# Patient Record
Sex: MALE | Race: WHITE | NOT HISPANIC OR LATINO
[De-identification: names, ages, dates, MRNs, and addresses within clinical notes are randomized per-mention and may not be internally consistent; named-entity substitution may affect disease eponyms.]

---

## 2018-09-20 PROBLEM — Z00.00 ENCOUNTER FOR PREVENTIVE HEALTH EXAMINATION: Status: ACTIVE | Noted: 2018-09-20

## 2018-09-24 ENCOUNTER — APPOINTMENT (OUTPATIENT)
Dept: CT IMAGING | Facility: CLINIC | Age: 83
End: 2018-09-24

## 2018-12-21 ENCOUNTER — EMERGENCY (EMERGENCY)
Facility: HOSPITAL | Age: 83
LOS: 1 days | Discharge: HOME | End: 2018-12-21
Attending: EMERGENCY MEDICINE | Admitting: EMERGENCY MEDICINE
Payer: COMMERCIAL

## 2018-12-21 VITALS
HEART RATE: 71 BPM | RESPIRATION RATE: 16 BRPM | TEMPERATURE: 98 F | DIASTOLIC BLOOD PRESSURE: 66 MMHG | OXYGEN SATURATION: 96 % | SYSTOLIC BLOOD PRESSURE: 128 MMHG

## 2018-12-21 VITALS
HEART RATE: 78 BPM | WEIGHT: 214.95 LBS | RESPIRATION RATE: 19 BRPM | TEMPERATURE: 98 F | OXYGEN SATURATION: 94 % | HEIGHT: 64 IN | DIASTOLIC BLOOD PRESSURE: 85 MMHG | SYSTOLIC BLOOD PRESSURE: 155 MMHG

## 2018-12-21 LAB
ALBUMIN SERPL ELPH-MCNC: 3.4 G/DL — SIGNIFICANT CHANGE UP (ref 3.3–5)
ALP SERPL-CCNC: 223 U/L — HIGH (ref 40–120)
ALT FLD-CCNC: 39 U/L — SIGNIFICANT CHANGE UP (ref 12–78)
ANION GAP SERPL CALC-SCNC: 9 MMOL/L — SIGNIFICANT CHANGE UP (ref 5–17)
AST SERPL-CCNC: 22 U/L — SIGNIFICANT CHANGE UP (ref 15–37)
BILIRUB SERPL-MCNC: 0.2 MG/DL — SIGNIFICANT CHANGE UP (ref 0.2–1.2)
BUN SERPL-MCNC: 20 MG/DL — SIGNIFICANT CHANGE UP (ref 7–23)
CALCIUM SERPL-MCNC: 8.2 MG/DL — LOW (ref 8.5–10.1)
CHLORIDE SERPL-SCNC: 108 MMOL/L — SIGNIFICANT CHANGE UP (ref 96–108)
CO2 SERPL-SCNC: 27 MMOL/L — SIGNIFICANT CHANGE UP (ref 22–31)
CREAT SERPL-MCNC: 0.96 MG/DL — SIGNIFICANT CHANGE UP (ref 0.5–1.3)
D DIMER BLD IA.RAPID-MCNC: 252 NG/ML DDU — HIGH
FLU A RESULT: SIGNIFICANT CHANGE UP
FLU A RESULT: SIGNIFICANT CHANGE UP
FLUAV AG NPH QL: SIGNIFICANT CHANGE UP
FLUBV AG NPH QL: SIGNIFICANT CHANGE UP
GLUCOSE SERPL-MCNC: 119 MG/DL — HIGH (ref 70–99)
HCT VFR BLD CALC: 40.2 % — SIGNIFICANT CHANGE UP (ref 39–50)
HGB BLD-MCNC: 13.3 G/DL — SIGNIFICANT CHANGE UP (ref 13–17)
MCHC RBC-ENTMCNC: 29.5 PG — SIGNIFICANT CHANGE UP (ref 27–34)
MCHC RBC-ENTMCNC: 33.1 GM/DL — SIGNIFICANT CHANGE UP (ref 32–36)
MCV RBC AUTO: 89.1 FL — SIGNIFICANT CHANGE UP (ref 80–100)
NRBC # BLD: 0 /100 WBCS — SIGNIFICANT CHANGE UP (ref 0–0)
PLATELET # BLD AUTO: 200 K/UL — SIGNIFICANT CHANGE UP (ref 150–400)
POTASSIUM SERPL-MCNC: 3.7 MMOL/L — SIGNIFICANT CHANGE UP (ref 3.5–5.3)
POTASSIUM SERPL-SCNC: 3.7 MMOL/L — SIGNIFICANT CHANGE UP (ref 3.5–5.3)
PROT SERPL-MCNC: 7.1 G/DL — SIGNIFICANT CHANGE UP (ref 6–8.3)
RBC # BLD: 4.51 M/UL — SIGNIFICANT CHANGE UP (ref 4.2–5.8)
RBC # FLD: 14.6 % — HIGH (ref 10.3–14.5)
RSV RESULT: SIGNIFICANT CHANGE UP
RSV RNA RESP QL NAA+PROBE: SIGNIFICANT CHANGE UP
SODIUM SERPL-SCNC: 144 MMOL/L — SIGNIFICANT CHANGE UP (ref 135–145)
TROPONIN I SERPL-MCNC: <.015 NG/ML — SIGNIFICANT CHANGE UP (ref 0.01–0.04)
TROPONIN I SERPL-MCNC: <.015 NG/ML — SIGNIFICANT CHANGE UP (ref 0.01–0.04)
WBC # BLD: 5.34 K/UL — SIGNIFICANT CHANGE UP (ref 3.8–10.5)
WBC # FLD AUTO: 5.34 K/UL — SIGNIFICANT CHANGE UP (ref 3.8–10.5)

## 2018-12-21 PROCEDURE — 99285 EMERGENCY DEPT VISIT HI MDM: CPT

## 2018-12-21 PROCEDURE — 85379 FIBRIN DEGRADATION QUANT: CPT

## 2018-12-21 PROCEDURE — 71045 X-RAY EXAM CHEST 1 VIEW: CPT

## 2018-12-21 PROCEDURE — 99282 EMERGENCY DEPT VISIT SF MDM: CPT | Mod: GC

## 2018-12-21 PROCEDURE — 93970 EXTREMITY STUDY: CPT | Mod: 26

## 2018-12-21 PROCEDURE — 93005 ELECTROCARDIOGRAM TRACING: CPT

## 2018-12-21 PROCEDURE — 71275 CT ANGIOGRAPHY CHEST: CPT | Mod: 26

## 2018-12-21 PROCEDURE — 71275 CT ANGIOGRAPHY CHEST: CPT

## 2018-12-21 PROCEDURE — 96374 THER/PROPH/DIAG INJ IV PUSH: CPT | Mod: XU

## 2018-12-21 PROCEDURE — 80053 COMPREHEN METABOLIC PANEL: CPT

## 2018-12-21 PROCEDURE — 93970 EXTREMITY STUDY: CPT

## 2018-12-21 PROCEDURE — 99284 EMERGENCY DEPT VISIT MOD MDM: CPT | Mod: 25

## 2018-12-21 PROCEDURE — 36415 COLL VENOUS BLD VENIPUNCTURE: CPT

## 2018-12-21 PROCEDURE — 87631 RESP VIRUS 3-5 TARGETS: CPT

## 2018-12-21 PROCEDURE — 85027 COMPLETE CBC AUTOMATED: CPT

## 2018-12-21 PROCEDURE — 71045 X-RAY EXAM CHEST 1 VIEW: CPT | Mod: 26

## 2018-12-21 PROCEDURE — 84484 ASSAY OF TROPONIN QUANT: CPT

## 2018-12-21 RX ORDER — ASPIRIN/CALCIUM CARB/MAGNESIUM 324 MG
325 TABLET ORAL ONCE
Qty: 0 | Refills: 0 | Status: COMPLETED | OUTPATIENT
Start: 2018-12-21 | End: 2018-12-21

## 2018-12-21 RX ORDER — PANTOPRAZOLE SODIUM 20 MG/1
40 TABLET, DELAYED RELEASE ORAL ONCE
Qty: 0 | Refills: 0 | Status: COMPLETED | OUTPATIENT
Start: 2018-12-21 | End: 2018-12-21

## 2018-12-21 RX ADMIN — Medication 325 MILLIGRAM(S): at 01:40

## 2018-12-21 RX ADMIN — PANTOPRAZOLE SODIUM 40 MILLIGRAM(S): 20 TABLET, DELAYED RELEASE ORAL at 01:58

## 2018-12-21 NOTE — ED PROVIDER NOTE - PROGRESS NOTE DETAILS
Dr Real notified will contact Dr Blanco. Cardiology Dr [facility] notified patient with 2 negative trops  patient with hiatal hernia with omentum - eval by Dr. Longoria - can follow up in office

## 2018-12-21 NOTE — ED ADULT NURSE NOTE - OBJECTIVE STATEMENT
Pt comes by EMS. Pt reports burning to epigastric area and esophagus. Pt breathing easy, unlabored, no signs of distress. Pt reports having trouble breathing sometimes. Pt placed on telemetry monitor. Pt recently traveled from [facility].

## 2018-12-21 NOTE — ED PROVIDER NOTE - CARE PLAN
Principal Discharge DX:	Chest pain Principal Discharge DX:	Chest pain  Secondary Diagnosis:	Hiatal hernia

## 2018-12-21 NOTE — ED PROVIDER NOTE - SIGNIFICANT NEGATIVE FINDINGS
no headache, no syncope, no diaphoresis, no eradiation, no palpitations, no abdominal pain,  no n/v, no urinary symptoms, no bleeding. no neuro changes.

## 2018-12-21 NOTE — ED ADULT NURSE REASSESSMENT NOTE - NS ED NURSE REASSESS COMMENT FT1
pt pain is more controlled, pt is comfortable at this time. pt indicates he is unable to give urine at this time. pt has family at the bedside. pt is awaiting txp to CT

## 2018-12-21 NOTE — ED ADULT NURSE REASSESSMENT NOTE - NS ED NURSE REASSESS COMMENT FT1
pt received at change of shift, pt agitated stating want to go home and nothing wrong with my heart. Pt consoled at length and agreeable to wait for cardio. No obvious distress noted. skin color good.

## 2018-12-21 NOTE — CONSULT NOTE ADULT - ASSESSMENT
Mr. Roque is a 89 year old male, with an episode of chest pain. The pain is atypical in origin, and more pleuritic in description.  1st set of CE is negative. EKG is a SR with RBBB, but no sign of ischemia.    - Repeat CE in 6 hours  - Add on d-dimer given pleuritic nature of pain and recent flight  - LE dopplers  - If all above is negative, no cardiac contraindication to d/c home. He will follow up with his primary cardiologist/PMD.

## 2018-12-21 NOTE — ED PROVIDER NOTE - CARE PROVIDER_API CALL
Kedar Longoria), Surgery  [address]  [address] [place] [address]  Phone: [phone]  Fax: [phone]    Raad Lofton), Cardiovascular Disease; Internal Medicine  [address] [place] [address]  Phone: [phone]  Fax: [phone]

## 2018-12-21 NOTE — ED ADULT TRIAGE NOTE - CHIEF COMPLAINT QUOTE
Patient presents via ambulance, "I have chest pressure and heart burn when I woke up 3 hours ago"   patient traveled from [facility] 2 days ago. Patient presents via ambulance, "I have chest pressure and heart burn when I woke up 3 hours ago"   patient traveled from [facility] 2 days ago, was seen by PCP yesterday for "body aches and not given any medication for it"

## 2018-12-21 NOTE — CONSULT NOTE ADULT - SUBJECTIVE AND OBJECTIVE BOX
[facility] - Andrae Lofton, Khadra, Tom, Chandrika, Kati Dukes  Office Number: [phone]    Initial Consult Note    CHIEF COMPLAINT: Patient is a 89y old  Male who presents with a chief complaint of chest pain    HPI:  89 y/o WM h/o Hyperlipidemia C/C  " [facility]SP[facility] chest pain described as heart burn with radiation to the throat.  When he woke up 3 hours ago PTA , occasional SOB, no diaphoresis, no Nausea, no vomiting patient traveled from [facility] 2 days ago, was seen by PCP yesterday for body aches. No fever no chills, no abdominal pain, no N/V no urinary symptoms, no GIB, no acute stroke symptoms    Recent flight home from [facility]. Reports le edema.  Initially went to his doctor because of a lung issue and difficulty breathing and was given an inhaler. Was told to go to the hospital if any fever or pain.  Has chest pain when taking deep breaths, but is resolved now.    PAST MEDICAL & SURGICAL HISTORY:  Hyperlipidemia  No significant past surgical history      SOCIAL HISTORY:  Denies significant tobacco, ethanol, or drug abuse.    FAMILY HISTORY:    No family history of acute MI or sudden cardiac death.    MEDICATIONS  (STANDING):    MEDICATIONS  (PRN):      Allergies    No Known Allergies    Intolerances        REVIEW OF SYSTEMS:    CONSTITUTIONAL: No weakness, fevers or chills  EYES/ENT: No visual changes;  No vertigo or throat pain   NECK: No pain or stiffness  RESPIRATORY: No cough, wheezing, hemoptysis; No shortness of breath  CARDIOVASCULAR: + chest pain, no palpitations  GASTROINTESTINAL: No abdominal pain. No nausea, vomiting, or hematemesis; No diarrhea or constipation. No melena or hematochezia.  GENITOURINARY: No dysuria, frequency or hematuria  NEUROLOGICAL: No numbness or weakness  SKIN: No itching or rash  All other review of systems is negative unless indicated above    VITAL SIGNS:   Vital Signs Last 24 Hrs  T(C): 36.4 (21 Dec 2018 06:00), Max: 36.5 (21 Dec 2018 01:00)  T(F): 97.6 (21 Dec 2018 06:00), Max: 97.7 (21 Dec 2018 01:00)  HR: 71 (21 Dec 2018 06:00) (71 - 78)  BP: 128/66 (21 Dec 2018 06:00) (128/66 - 155/85)  BP(mean): 87 (21 Dec 2018 06:00) (87 - 87)  RR: 16 (21 Dec 2018 06:00) (16 - 19)  SpO2: 96% (21 Dec 2018 06:00) (94% - 96%)    I&O's Summary      On Exam:    Constitutional: NAD, alert and oriented x 3  Lungs:  Non-labored, breath sounds are clear bilaterally, No wheezing, rales or rhonchi  Cardiovascular: RRR.  S1 and S2 positive.  No murmurs, rubs, gallops or clicks  Gastrointestinal: Bowel Sounds present, soft, nontender.   Lymph: + peripheral edema. No cervical lymphadenopathy.  Neurological: Alert, no focal deficits  Skin: No rashes or ulcers   Psych:  Mood & affect appropriate.    LABS: All Labs Reviewed:                        13.3   5.34  )-----------( 200      ( 21 Dec 2018 01:47 )             40.2     21 Dec 2018 01:47    144    |  108    |  20     ----------------------------<  119    3.7     |  27     |  0.96     Ca    8.2        21 Dec 2018 01:47    TPro  7.1    /  Alb  3.4    /  TBili  0.2    /  DBili  x      /  AST  22     /  ALT  39     /  AlkPhos  223    21 Dec 2018 01:47      CARDIAC MARKERS ( 21 Dec 2018 01:47 )  <.015 ng/mL / x     / x     / x     / x          Blood Culture:         RADIOLOGY:    EKG: SR, RBBB

## 2018-12-21 NOTE — CONSULT NOTE ADULT - SUBJECTIVE AND OBJECTIVE BOX
89 Y.O. M presents to [facility] ED with complaint of chest pain. Pt with recent travel to [place]. Pt reports pain is located centrally in the chest and radiates up and down towards epigastric region. Pt reports when he takes a deep breath there is some radiation to the back. Denies any arm pain, jaw pain. Reports occasional shoulder pain due to osteoarthritis. Pt reports he has a hx of reflux and takes occasional OTC anti-reflux meds. Pt underwent CT Angio of Chest which was negative to PE. Cardiac enzymes negative x 2. Incidental findings of intrathoracic omental herniation through the esophageal hiatus, as above. Pt reports chest discomfort has improved over the last few hrs.    PAST MEDICAL & SURGICAL HISTORY:  Hyperlipidemia  GERD    PSx: Right carotid endarectomy      Home Medications:  Lipitor:  (21 Dec 2018 02:34)    ICU Vital Signs Last 24 Hrs  T(C): 36.4 (21 Dec 2018 06:00), Max: 36.5 (21 Dec 2018 01:00)  T(F): 97.6 (21 Dec 2018 06:00), Max: 97.7 (21 Dec 2018 01:00)  HR: 71 (21 Dec 2018 06:00) (71 - 78)  BP: 128/66 (21 Dec 2018 06:00) (128/66 - 155/85)  BP(mean): 87 (21 Dec 2018 06:00) (87 - 87)  ABP: --  ABP(mean): --  RR: 16 (21 Dec 2018 06:00) (16 - 19)  SpO2: 96% (21 Dec 2018 06:00) (94% - 96%)      REVIEW OF SYSTEM:  CONSTITUTIONAL: No weakness, fevers or chills  EYES/ENT: No visual changes;  No vertigo or throat pain.  NECK: No pain or stiffness  RESPIRATORY: No cough, wheezing, hemoptysis; No shortness of breath  CARDIOVASCULAR: per HPI  GASTROINTESTINAL: No abdominal pain. Mild epigastric pain. No nausea, vomiting, or hematemesis; No diarrhea or constipation. No melena or hematochezia.  GENITOURINARY: No dysuria, frequency or hematuria  NEUROLOGICAL: No numbness or weakness  SKIN: No itching, burning, rashes, or lesions   All other review of systems is negative unless indicated above.      PHYSICAL EXAM:  GENERAL: NAD, well-groomed, well-developed  HEAD:  Atraumatic, Normocephalic  HEART: Regular rate and rhythm; No murmurs, rubs, or gallops. No chest wall or substernal ttp  RESPIRATORY: CTA B/L, No W/R/R  ABDOMEN: Soft, Nontender, protuberant, Nondistended; Bowel sounds present  NEUROLOGY: A&Ox3, nonfocal  SKIN: warm, dry, normal color, no rash or abnormal lesions                          13.3   5.34  )-----------( 200      ( 21 Dec 2018 01:47 )             40.2   12-21    144  |  108  |  20  ----------------------------<  119<H>  3.7   |  27  |  0.96    Ca    8.2<L>      21 Dec 2018 01:47    TPro  7.1  /  Alb  3.4  /  TBili  0.2  /  DBili  x   /  AST  22  /  ALT  39  /  AlkPhos  223<H>  12-21

## 2018-12-21 NOTE — ED ADULT NURSE NOTE - CHPI ED NUR SYMPTOMS NEG
no fever/no nausea/no back pain/no chills/no congestion/no syncope/no vomiting/no dizziness/no diaphoresis

## 2018-12-21 NOTE — CONSULT NOTE ADULT - ASSESSMENT
89 Y.O. with atypical chest pain, ruled out for cardiac/pulmonary origin likely related to hiatal hernia/GERD  1) Case discussed with Dr. Longoria- Pt to be discharged home on PPI and follow up with Dr. Longoria in the office for further work-up of hiatal hernia.  2) Return to ED if symptoms worsen/persist.

## 2018-12-21 NOTE — ED ADULT NURSE NOTE - CHIEF COMPLAINT QUOTE
Patient presents via ambulance, "I have chest pressure and heart burn when I woke up 3 hours ago"   patient traveled from [facility] 2 days ago, was seen by PCP yesterday for "body aches and not given any medication for it"

## 2018-12-21 NOTE — ED PROVIDER NOTE - CARE PROVIDERS DIRECT ADDRESSES
[email or web address]

## 2018-12-21 NOTE — ED PROVIDER NOTE - OBJECTIVE STATEMENT
Patient presents via ambulance, "I have chest pressure and heart burn when I woke up 3 hours ago"   patient traveled from [facility] 2 days ago, was seen by PCP yesterday for "body aches and not given any medication for it"  chest pain Hyperlipidemia "I have chest pressure and heart burn when I woke up 3 hours ago"   patient traveled from [facility] 2 days ago, was seen by PCP yesterday for "body aches and not given any medication for it"  chest pain 89 y/o WM h/o Hyperlipidemia C/C  " [facility] chest pain described as heart burn with radiation to the throat.  When he woke up 3 hours ago PTA , occasional SOB, no diaphoresis, no Nausea, no vomiting patient traveled from [facility] 2 days ago, was seen by PCP yesterday for body aches. No fever no chills, no abdominal pain, no N/V no urinary symptoms, no GIB, no acute stroke symptoms

## 2018-12-22 RX ORDER — ATORVASTATIN CALCIUM 80 MG/1
0 TABLET, FILM COATED ORAL
Qty: 0 | Refills: 0 | COMMUNITY

## 2018-12-27 PROBLEM — E78.5 HYPERLIPIDEMIA, UNSPECIFIED: Chronic | Status: ACTIVE | Noted: 2018-12-21

## 2019-01-02 ENCOUNTER — APPOINTMENT (OUTPATIENT)
Dept: CARDIOLOGY | Facility: CLINIC | Age: 84
End: 2019-01-02
Payer: MEDICARE

## 2019-01-02 ENCOUNTER — NON-APPOINTMENT (OUTPATIENT)
Age: 84
End: 2019-01-02

## 2019-01-02 VITALS
WEIGHT: 209 LBS | SYSTOLIC BLOOD PRESSURE: 164 MMHG | HEIGHT: 65 IN | HEART RATE: 84 BPM | DIASTOLIC BLOOD PRESSURE: 77 MMHG | OXYGEN SATURATION: 95 % | BODY MASS INDEX: 34.82 KG/M2

## 2019-01-02 VITALS — DIASTOLIC BLOOD PRESSURE: 76 MMHG | SYSTOLIC BLOOD PRESSURE: 128 MMHG

## 2019-01-02 DIAGNOSIS — Z87.891 PERSONAL HISTORY OF NICOTINE DEPENDENCE: ICD-10-CM

## 2019-01-02 DIAGNOSIS — K44.9 DIAPHRAGMATIC HERNIA W/OUT OBSTRUCTION OR GANGRENE: ICD-10-CM

## 2019-01-02 DIAGNOSIS — Z87.81 PERSONAL HISTORY OF (HEALED) TRAUMATIC FRACTURE: ICD-10-CM

## 2019-01-02 DIAGNOSIS — R07.89 OTHER CHEST PAIN: ICD-10-CM

## 2019-01-02 DIAGNOSIS — I65.21 OCCLUSION AND STENOSIS OF RIGHT CAROTID ARTERY: ICD-10-CM

## 2019-01-02 DIAGNOSIS — I45.10 UNSPECIFIED RIGHT BUNDLE-BRANCH BLOCK: ICD-10-CM

## 2019-01-02 DIAGNOSIS — E78.5 HYPERLIPIDEMIA, UNSPECIFIED: ICD-10-CM

## 2019-01-02 DIAGNOSIS — H35.30 UNSPECIFIED MACULAR DEGENERATION: ICD-10-CM

## 2019-01-02 DIAGNOSIS — G45.9 TRANSIENT CEREBRAL ISCHEMIC ATTACK, UNSPECIFIED: ICD-10-CM

## 2019-01-02 DIAGNOSIS — I49.1 ATRIAL PREMATURE DEPOLARIZATION: ICD-10-CM

## 2019-01-02 PROCEDURE — 93000 ELECTROCARDIOGRAM COMPLETE: CPT

## 2019-01-02 PROCEDURE — 99215 OFFICE O/P EST HI 40 MIN: CPT

## 2019-01-02 RX ORDER — ATORVASTATIN CALCIUM 20 MG/1
20 TABLET, FILM COATED ORAL DAILY
Qty: 90 | Refills: 3 | Status: ACTIVE | COMMUNITY

## 2019-01-02 RX ORDER — VIT A/VIT C/VIT E/ZINC/COPPER 4296-226
CAPSULE ORAL DAILY
Refills: 0 | Status: ACTIVE | COMMUNITY

## 2019-01-02 RX ORDER — PNV NO.95/FERROUS FUM/FOLIC AC 28MG-0.8MG
TABLET ORAL
Refills: 0 | Status: ACTIVE | COMMUNITY

## 2019-01-02 RX ORDER — ASPIRIN 81 MG
81 TABLET, DELAYED RELEASE (ENTERIC COATED) ORAL DAILY
Refills: 0 | Status: ACTIVE | COMMUNITY

## 2019-01-02 NOTE — DISCUSSION/SUMMARY
[FreeTextEntry1] : The epigastric/chest discomfort symptom that the patient experienced on 12/21/18 in all likelihood represented esophageal reflux related to a moderate-sized hiatal hernia, demonstrated on the chest CT angiogram. The evaluation in the emergency room was negative for evidence of an acute coronary syndrome. The patient does not describe having experienced any signs or symptoms which would be considered typical for an anginal syndrome, congestive heart failure, or a hemodynamically-compromising arrhythmia. His cardiac examination today is remarkable for a soft systolic ejection murmur, and all likelihood representing age-related valvular calcification. He is not exhibiting evidence for congestive heart failure on examination. He does have a right carotid endarterectomy scar. His blood pressure reading was moderately elevated upon initial presentation to the office today, however, a follow-up measurement obtained after his examination revealed the reading to be normal. His electrocardiogram today reveals sinus rhythm with an isolated atrial premature contraction and a right bundle branch block pattern.\par \par I have reassured the patient today that his symptom of epigastric/chest discomfort on 12/21/18 was not cardiac-related based on his evaluation in the emergency room, the more likely, represented esophageal reflux related to a moderate-sized hiatal hernia. The patient states that the symptoms he experienced on 12/21/18 was reminiscent of previous episodes of heartburn, which have been relieved with Mylanta.\par \par On the electrocardiogram in this patient most likely represents age-related cardiac conduction system disease. The patient does not describe having experienced any signs or symptoms to suggest the presence of a hemodynamically-compromising bradyarrhythmia.\par \par The patient is going to be returning to his home in [place] toward the end of this week. I have recommended to him that he follow up with his physician when he returns home regarding his symptoms of esophageal reflux and the finding of a moderate-sized hiatal hernia on the chest CT angiogram performed on 12/21/18. I have asked the patient to call our office in the interim for any questions or problems pertaining to these matters, and especially if he should experience any concerning signs or symptoms.

## 2019-01-02 NOTE — PHYSICAL EXAM
[General Appearance - In No Acute Distress] : no acute distress [Normal Conjunctiva] : the conjunctiva exhibited no abnormalities [No Oral Pallor] : no oral pallor [Respiration, Rhythm And Depth] : normal respiratory rhythm and effort [Auscultation Breath Sounds / Voice Sounds] : lungs were clear to auscultation bilaterally [Not Palpable] : not palpable [No Precordial Heave] : no precordial heave was noted [Normal Rate] : normal [Rhythm Regular] : regular [Normal S1] : normal S1 [Normal S2] : normal S2 [No Gallop] : no gallop heard [I] : a grade 1 [2+] : left 2+ [No Abnormalities] : the abdominal aorta was not enlarged and no bruit was heard [No Pitting Edema] : no pitting edema present [Rt] : varicose veins of the right leg noted [Lt] : varicose veins of the left leg noted [Bowel Sounds] : normal bowel sounds [Abdomen Tenderness] : non-tender [Abnormal Walk] : normal gait [Cyanosis, Localized] : no localized cyanosis [] : no rash [Oriented To Time, Place, And Person] : oriented to person, place, and time [FreeTextEntry1] : no JVD is appreciated a 45° angle [Apical Thrill] : no thrill palpable at the apex [Click] : no click [Distant] : the heart sounds were ~L not distant [Pericardial Rub] : no pericardial rub [Right Carotid Bruit] : no bruit heard over the right carotid [Left Carotid Bruit] : no bruit heard over the left carotid

## 2019-01-02 NOTE — HISTORY OF PRESENT ILLNESS
[FreeTextEntry1] : Mr. Orlin Ramirez presented to the office today for follow-up cardiac evaluation, noting that he was recently evaluated in the emergency room at [facility] for atypical chest discomfort.\par \par The patient is a 89-year-old male who is visiting [place] from [place], where he plans on returning at the end of the week. He is a suboptimal historian. He denies having any known cardiac history. He does describe having experienced a TIA in 2011, having presented with a visual disturbance involving the right eye, and subsequently undergoing a right carotid endarterectomy procedure on 8/17/11. He has a history of a dyslipidemia, which has been managed with statin therapy. He described having a history of a hiatal hernia and GERD.\par \par The patient awakened on 12/21/18 with a epigastric/chest discomfort, described as a sensation of "heartburn", radiating to the throat. He has experienced this sensation previously, with the symptom typically being relieved with Mylanta. He was staying at a friend's house in [place], however, and he did not have Mylanta with him. His friend became concerned, and summoned an ambulance. The patient was evaluated in the emergency room at [facility] by my associate, Dr. Parikh, removal of that at the patient's symptom was not cardiac in nature. A right bundle-branch block pattern was exhibited on the patient's electrocardiogram, without acute ischemic changes. A chest x-ray revealed clear lung fields. Cardiac enzymes were negative for evidence of an acute coronary syndrome. The D-dimer level was mildly elevated, for which low extremity venous Doppler testing and chest CT angiography were performed. The lower extremity venous Doppler study was negative for evidence of deep venous thrombosis. The chest CT angiogram was negative for evidence of a pulmonary embolism. Age-related atherosclerotic changes were noted.  A moderate-sized hiatal hernia was noted. The patient's presenting symptoms were attributed to GERD, for which a proton pump inhibitor was recommended to the patient (he has not obtained this medication, however). He was released from the emergency room after several hours of observation and evaluation, and has not experienced recurrence of similar symptoms since his presentation.\par \par The patient does not describe having experienced any symptoms of chest discomfort which have been precipitated or exacerbated by physical activity. He has not noted dyspnea on exertion in association with his usual activities. He has not noted orthopnea or paroxysmal nocturnal dyspnea. He reports having exhibited mild lower extremity swelling for several years now, without any recent appreciable change in the degree. He has not experienced any episodes of palpitations, presyncope or syncope.\par \par Review of systems is significant for the patient having recently been evaluated by a pulmonologist regarding a carotid dyspnea, and he describes having been diagnosed as having "bronchitis", for which Breo Ellipta inhaler was prescribed (he is no longer using this inhaler).\par \par As far as risk factors for coronary artery disease are concerned, the patient has a history of a dyslipidemia, for which he is presently being treated with Lipitor. He denies having a history of diabetes or hypertension. He reports having discontinued cigarette smoking in 1954, having previously smoked up to 5 packs per day for a period of approximately 5 years. He does not have a known immediate family history of premature coronary artery disease.\par \par Other than previously stated, past medical/surgical history according to the patient is significant for bilateral shoulder replacement surgeries, a traumatic cervical fracture (the patient states that a door fell on his head), and tonsillectomy as a child.

## 2019-01-08 ENCOUNTER — APPOINTMENT (OUTPATIENT)
Dept: SURGERY | Facility: CLINIC | Age: 84
End: 2019-01-08

## 2019-02-10 PROBLEM — I45.10 RIGHT BUNDLE BRANCH BLOCK (RBBB): Status: ACTIVE | Noted: 2019-01-02

## 2020-06-30 NOTE — ED ADULT NURSE NOTE - CCCP TRG CHIEF CMPLNT
chest pain Minoxidil Counseling: Minoxidil is a topical medication which can increase blood flow where it is applied. It is uncertain how this medication increases hair growth. Side effects are uncommon and include stinging and allergic reactions.

## 2021-10-04 ENCOUNTER — EMERGENCY (EMERGENCY)
Facility: HOSPITAL | Age: 86
LOS: 1 days | Discharge: HOME | End: 2021-10-04
Attending: EMERGENCY MEDICINE | Admitting: EMERGENCY MEDICINE
Payer: MEDICARE

## 2021-10-04 VITALS
DIASTOLIC BLOOD PRESSURE: 79 MMHG | WEIGHT: 225.09 LBS | RESPIRATION RATE: 18 BRPM | SYSTOLIC BLOOD PRESSURE: 184 MMHG | HEART RATE: 86 BPM | TEMPERATURE: 100 F | OXYGEN SATURATION: 93 % | HEIGHT: 64 IN

## 2021-10-04 VITALS
TEMPERATURE: 99 F | OXYGEN SATURATION: 94 % | SYSTOLIC BLOOD PRESSURE: 155 MMHG | RESPIRATION RATE: 16 BRPM | HEART RATE: 85 BPM | DIASTOLIC BLOOD PRESSURE: 74 MMHG

## 2021-10-04 LAB
ALBUMIN SERPL ELPH-MCNC: 3.8 G/DL — SIGNIFICANT CHANGE UP (ref 3.3–5)
ALP SERPL-CCNC: 247 U/L — HIGH (ref 40–120)
ALT FLD-CCNC: 32 U/L — SIGNIFICANT CHANGE UP (ref 12–78)
ANION GAP SERPL CALC-SCNC: 7 MMOL/L — SIGNIFICANT CHANGE UP (ref 5–17)
APPEARANCE UR: CLEAR — SIGNIFICANT CHANGE UP
AST SERPL-CCNC: 24 U/L — SIGNIFICANT CHANGE UP (ref 15–37)
BASOPHILS # BLD AUTO: 0.02 K/UL — SIGNIFICANT CHANGE UP (ref 0–0.2)
BASOPHILS NFR BLD AUTO: 0.3 % — SIGNIFICANT CHANGE UP (ref 0–2)
BILIRUB SERPL-MCNC: 0.4 MG/DL — SIGNIFICANT CHANGE UP (ref 0.2–1.2)
BILIRUB UR-MCNC: NEGATIVE — SIGNIFICANT CHANGE UP
BUN SERPL-MCNC: 20 MG/DL — SIGNIFICANT CHANGE UP (ref 7–23)
CALCIUM SERPL-MCNC: 8.7 MG/DL — SIGNIFICANT CHANGE UP (ref 8.5–10.1)
CHLORIDE SERPL-SCNC: 108 MMOL/L — SIGNIFICANT CHANGE UP (ref 96–108)
CO2 SERPL-SCNC: 27 MMOL/L — SIGNIFICANT CHANGE UP (ref 22–31)
COLOR SPEC: YELLOW — SIGNIFICANT CHANGE UP
CREAT SERPL-MCNC: 1.1 MG/DL — SIGNIFICANT CHANGE UP (ref 0.5–1.3)
DIFF PNL FLD: ABNORMAL
EOSINOPHIL # BLD AUTO: 0.07 K/UL — SIGNIFICANT CHANGE UP (ref 0–0.5)
EOSINOPHIL NFR BLD AUTO: 1.1 % — SIGNIFICANT CHANGE UP (ref 0–6)
GLUCOSE SERPL-MCNC: 131 MG/DL — HIGH (ref 70–99)
GLUCOSE UR QL: NEGATIVE — SIGNIFICANT CHANGE UP
HCT VFR BLD CALC: 44.3 % — SIGNIFICANT CHANGE UP (ref 39–50)
HGB BLD-MCNC: 14.4 G/DL — SIGNIFICANT CHANGE UP (ref 13–17)
IMM GRANULOCYTES NFR BLD AUTO: 0.5 % — SIGNIFICANT CHANGE UP (ref 0–1.5)
KETONES UR-MCNC: NEGATIVE — SIGNIFICANT CHANGE UP
LEUKOCYTE ESTERASE UR-ACNC: NEGATIVE — SIGNIFICANT CHANGE UP
LYMPHOCYTES # BLD AUTO: 0.69 K/UL — LOW (ref 1–3.3)
LYMPHOCYTES # BLD AUTO: 10.8 % — LOW (ref 13–44)
MCHC RBC-ENTMCNC: 29.8 PG — SIGNIFICANT CHANGE UP (ref 27–34)
MCHC RBC-ENTMCNC: 32.5 GM/DL — SIGNIFICANT CHANGE UP (ref 32–36)
MCV RBC AUTO: 91.5 FL — SIGNIFICANT CHANGE UP (ref 80–100)
MONOCYTES # BLD AUTO: 0.56 K/UL — SIGNIFICANT CHANGE UP (ref 0–0.9)
MONOCYTES NFR BLD AUTO: 8.7 % — SIGNIFICANT CHANGE UP (ref 2–14)
NEUTROPHILS # BLD AUTO: 5.04 K/UL — SIGNIFICANT CHANGE UP (ref 1.8–7.4)
NEUTROPHILS NFR BLD AUTO: 78.6 % — HIGH (ref 43–77)
NITRITE UR-MCNC: NEGATIVE — SIGNIFICANT CHANGE UP
NRBC # BLD: 0 /100 WBCS — SIGNIFICANT CHANGE UP (ref 0–0)
PH UR: 5 — SIGNIFICANT CHANGE UP (ref 5–8)
PLATELET # BLD AUTO: 177 K/UL — SIGNIFICANT CHANGE UP (ref 150–400)
POTASSIUM SERPL-MCNC: 3.9 MMOL/L — SIGNIFICANT CHANGE UP (ref 3.5–5.3)
POTASSIUM SERPL-SCNC: 3.9 MMOL/L — SIGNIFICANT CHANGE UP (ref 3.5–5.3)
PROT SERPL-MCNC: 8.1 G/DL — SIGNIFICANT CHANGE UP (ref 6–8.3)
PROT UR-MCNC: NEGATIVE — SIGNIFICANT CHANGE UP
RBC # BLD: 4.84 M/UL — SIGNIFICANT CHANGE UP (ref 4.2–5.8)
RBC # FLD: 14.5 % — SIGNIFICANT CHANGE UP (ref 10.3–14.5)
SODIUM SERPL-SCNC: 142 MMOL/L — SIGNIFICANT CHANGE UP (ref 135–145)
SP GR SPEC: 1.01 — SIGNIFICANT CHANGE UP (ref 1.01–1.02)
TROPONIN I SERPL-MCNC: <.015 NG/ML — SIGNIFICANT CHANGE UP (ref 0.01–0.04)
UROBILINOGEN FLD QL: NEGATIVE — SIGNIFICANT CHANGE UP
WBC # BLD: 6.41 K/UL — SIGNIFICANT CHANGE UP (ref 3.8–10.5)
WBC # FLD AUTO: 6.41 K/UL — SIGNIFICANT CHANGE UP (ref 3.8–10.5)

## 2021-10-04 PROCEDURE — 93010 ELECTROCARDIOGRAM REPORT: CPT

## 2021-10-04 PROCEDURE — 70450 CT HEAD/BRAIN W/O DYE: CPT | Mod: 26,MG

## 2021-10-04 PROCEDURE — G1004: CPT

## 2021-10-04 PROCEDURE — 71045 X-RAY EXAM CHEST 1 VIEW: CPT | Mod: 26

## 2021-10-04 PROCEDURE — 99285 EMERGENCY DEPT VISIT HI MDM: CPT | Mod: CS

## 2021-10-04 RX ORDER — SODIUM CHLORIDE 9 MG/ML
1000 INJECTION INTRAMUSCULAR; INTRAVENOUS; SUBCUTANEOUS ONCE
Refills: 0 | Status: COMPLETED | OUTPATIENT
Start: 2021-10-04 | End: 2021-10-04

## 2021-10-04 RX ORDER — METOCLOPRAMIDE HCL 10 MG
10 TABLET ORAL ONCE
Refills: 0 | Status: COMPLETED | OUTPATIENT
Start: 2021-10-04 | End: 2021-10-04

## 2021-10-04 RX ADMIN — SODIUM CHLORIDE 2000 MILLILITER(S): 9 INJECTION INTRAMUSCULAR; INTRAVENOUS; SUBCUTANEOUS at 12:53

## 2021-10-04 RX ADMIN — Medication 10 MILLIGRAM(S): at 12:53

## 2021-10-04 NOTE — ED PROVIDER NOTE - PROGRESS NOTE DETAILS
Patient feeling significant better, ambulating without difficulty. Patient wants to go home. Will DC. Patient understands return precautions.

## 2021-10-04 NOTE — ED PROVIDER NOTE - PATIENT PORTAL LINK FT
You can access the FollowMyHealth Patient Portal offered by [facility] by registering at the following website: [email or web address] By joining [facility] [employer]s FollowMyHealth portal, you will also be able to view your health information using other applications (apps) compatible with our system.

## 2021-10-04 NOTE — ED PROVIDER NOTE - CLINICAL SUMMARY MEDICAL DECISION MAKING FREE TEXT BOX
83 yo male with h/o BIBA from home c/o generalized weakness, headache and dizziness since waking up this morning at 6 am. Associated with nausea, no vomiting. Patient states he felt too weak to get out of bed this morning. Denies fever, chills, speech/gait changes, visual changes, chest pain, sob, abd pain, vomiting, diarrhea, urinary sxs, flank pain, UE/LE weakness or paresthesias. PE: as above. A/P: ekg, labs, ct head, ua/cx, cxr, fluids, meds, reassess.

## 2021-10-04 NOTE — ED ADULT NURSE NOTE - CHPI ED NUR SYMPTOMS NEG
no back pain/no chills/no decreased eating/drinking/no fever/no headache/no loss of consciousness/no pain/no vomiting

## 2021-10-04 NOTE — ED PROVIDER NOTE - OBJECTIVE STATEMENT
85 yo male with h/o BIBA from home c/o generalized weakness, headache and dizziness since waking up this morning at 6 am. Associated with nausea, no vomiting. Patient states he felt too weak to get out of bed this morning. Denies fever, chills, speech/gait changes, visual changes, chest pain, sob, abd pain, vomiting, diarrhea, urinary sxs, flank pain, UE/LE weakness or paresthesias.     pmd: Dr. Riley Villalba

## 2021-10-04 NOTE — ED PROVIDER NOTE - NEUROLOGICAL, MLM
Alert and oriented, no focal deficits, no motor or sensory deficits. CN II-XII intact. speech clear, no facial asymmetry.

## 2021-10-04 NOTE — ED ADULT NURSE NOTE - OBJECTIVE STATEMENT
patient came in ED from home BIBA with c/o nausea and weakness since last night. alert and oriented x 4. non-labored respiration noted. afebrile. denies pain at this time. patient noted to be sleepy. abdomen nondistended, nontender.

## 2021-10-04 NOTE — ED PROVIDER NOTE - ATTENDING CONTRIBUTION TO CARE
85 yo M p/w co Gen weakness today , cough / uri x past couple days. no known fever/chills. no chest pain. no abd pain. no v/d. Pt with inc gen weakness today at home. No dysuria/ hematuria. no recent trauma. no other inj or co.  Pt vaccinated for covid.  exam; MM Moist. neck supple. no meningeal signs. abd soft NT. no hsm. no cvat. nl non-focal neuro exam. Nl resp effort. no w/r/r. No acc muscle use. No other acute findings  check labs, xr, UA, IVF, ro covid, , reeval

## 2021-10-04 NOTE — ED ADULT NURSE NOTE - NSIMPLEMENTINTERV_GEN_ALL_ED
Implemented All Fall with Harm Risk Interventions:  [place] to call system. Call bell, personal items and telephone within reach. Instruct patient to call for assistance. Room bathroom lighting operational. Non-slip footwear when patient is off stretcher. Physically safe environment: no spills, clutter or unnecessary equipment. Stretcher in lowest position, wheels locked, appropriate side rails in place. Provide visual cue, wrist band, yellow gown, etc. Monitor gait and stability. Monitor for mental status changes and reorient to person, place, and time. Review medications for side effects contributing to fall risk. Reinforce activity limits and safety measures with patient and family. Provide visual clues: red socks.

## 2021-10-05 ENCOUNTER — INPATIENT (INPATIENT)
Facility: HOSPITAL | Age: 86
LOS: 13 days | Discharge: NURSING, REHAB OR LONG-TERM CARE | DRG: 177 | End: 2021-10-19
Attending: FAMILY MEDICINE | Admitting: INTERNAL MEDICINE
Payer: MEDICARE

## 2021-10-05 VITALS
OXYGEN SATURATION: 91 % | RESPIRATION RATE: 18 BRPM | WEIGHT: 225.09 LBS | TEMPERATURE: 99 F | HEIGHT: 64 IN | SYSTOLIC BLOOD PRESSURE: 118 MMHG | DIASTOLIC BLOOD PRESSURE: 67 MMHG | HEART RATE: 92 BPM

## 2021-10-05 DIAGNOSIS — I25.10 ATHEROSCLEROTIC HEART DISEASE OF NATIVE CORONARY ARTERY WITHOUT ANGINA PECTORIS: ICD-10-CM

## 2021-10-05 DIAGNOSIS — Z29.9 ENCOUNTER FOR PROPHYLACTIC MEASURES, UNSPECIFIED: ICD-10-CM

## 2021-10-05 DIAGNOSIS — U07.1 COVID-19: ICD-10-CM

## 2021-10-05 DIAGNOSIS — Z98.890 OTHER SPECIFIED POSTPROCEDURAL STATES: Chronic | ICD-10-CM

## 2021-10-05 DIAGNOSIS — E78.5 HYPERLIPIDEMIA, UNSPECIFIED: ICD-10-CM

## 2021-10-05 LAB
ALBUMIN SERPL ELPH-MCNC: 3.3 G/DL — SIGNIFICANT CHANGE UP (ref 3.3–5)
ALBUMIN SERPL ELPH-MCNC: 3.5 G/DL — SIGNIFICANT CHANGE UP (ref 3.3–5)
ALP SERPL-CCNC: 210 U/L — HIGH (ref 40–120)
ALP SERPL-CCNC: 224 U/L — HIGH (ref 40–120)
ALT FLD-CCNC: 33 U/L — SIGNIFICANT CHANGE UP (ref 12–78)
ALT FLD-CCNC: 34 U/L — SIGNIFICANT CHANGE UP (ref 12–78)
ANION GAP SERPL CALC-SCNC: 5 MMOL/L — SIGNIFICANT CHANGE UP (ref 5–17)
APTT BLD: 34.6 SEC — SIGNIFICANT CHANGE UP (ref 27.5–35.5)
AST SERPL-CCNC: 28 U/L — SIGNIFICANT CHANGE UP (ref 15–37)
AST SERPL-CCNC: 31 U/L — SIGNIFICANT CHANGE UP (ref 15–37)
BASOPHILS # BLD AUTO: 0.01 K/UL — SIGNIFICANT CHANGE UP (ref 0–0.2)
BASOPHILS NFR BLD AUTO: 0.2 % — SIGNIFICANT CHANGE UP (ref 0–2)
BILIRUB DIRECT SERPL-MCNC: <.1 MG/DL — SIGNIFICANT CHANGE UP (ref 0.05–0.2)
BILIRUB INDIRECT FLD-MCNC: >0.2 MG/DL — SIGNIFICANT CHANGE UP (ref 0.2–1)
BILIRUB SERPL-MCNC: 0.3 MG/DL — SIGNIFICANT CHANGE UP (ref 0.2–1.2)
BILIRUB SERPL-MCNC: 0.4 MG/DL — SIGNIFICANT CHANGE UP (ref 0.2–1.2)
BUN SERPL-MCNC: 17 MG/DL — SIGNIFICANT CHANGE UP (ref 7–23)
CALCIUM SERPL-MCNC: 8.3 MG/DL — LOW (ref 8.5–10.1)
CHLORIDE SERPL-SCNC: 108 MMOL/L — SIGNIFICANT CHANGE UP (ref 96–108)
CO2 SERPL-SCNC: 27 MMOL/L — SIGNIFICANT CHANGE UP (ref 22–31)
CREAT SERPL-MCNC: 0.98 MG/DL — SIGNIFICANT CHANGE UP (ref 0.5–1.3)
CREAT SERPL-MCNC: 0.99 MG/DL — SIGNIFICANT CHANGE UP (ref 0.5–1.3)
D DIMER BLD IA.RAPID-MCNC: 263 NG/ML DDU — HIGH
EOSINOPHIL # BLD AUTO: 0.01 K/UL — SIGNIFICANT CHANGE UP (ref 0–0.5)
EOSINOPHIL NFR BLD AUTO: 0.2 % — SIGNIFICANT CHANGE UP (ref 0–6)
FERRITIN SERPL-MCNC: 97 NG/ML — SIGNIFICANT CHANGE UP (ref 30–400)
GLUCOSE SERPL-MCNC: 136 MG/DL — HIGH (ref 70–99)
HCT VFR BLD CALC: 42.6 % — SIGNIFICANT CHANGE UP (ref 39–50)
HGB BLD-MCNC: 13.8 G/DL — SIGNIFICANT CHANGE UP (ref 13–17)
IMM GRANULOCYTES NFR BLD AUTO: 0.3 % — SIGNIFICANT CHANGE UP (ref 0–1.5)
INR BLD: 1.38 RATIO — HIGH (ref 0.88–1.16)
LACTATE SERPL-SCNC: 1.2 MMOL/L — SIGNIFICANT CHANGE UP (ref 0.7–2)
LYMPHOCYTES # BLD AUTO: 0.58 K/UL — LOW (ref 1–3.3)
LYMPHOCYTES # BLD AUTO: 8.9 % — LOW (ref 13–44)
MCHC RBC-ENTMCNC: 29.4 PG — SIGNIFICANT CHANGE UP (ref 27–34)
MCHC RBC-ENTMCNC: 32.4 GM/DL — SIGNIFICANT CHANGE UP (ref 32–36)
MCV RBC AUTO: 90.6 FL — SIGNIFICANT CHANGE UP (ref 80–100)
MONOCYTES # BLD AUTO: 0.67 K/UL — SIGNIFICANT CHANGE UP (ref 0–0.9)
MONOCYTES NFR BLD AUTO: 10.3 % — SIGNIFICANT CHANGE UP (ref 2–14)
NEUTROPHILS # BLD AUTO: 5.2 K/UL — SIGNIFICANT CHANGE UP (ref 1.8–7.4)
NEUTROPHILS NFR BLD AUTO: 80.1 % — HIGH (ref 43–77)
NRBC # BLD: 0 /100 WBCS — SIGNIFICANT CHANGE UP (ref 0–0)
NT-PROBNP SERPL-SCNC: 532 PG/ML — HIGH (ref 0–450)
PLATELET # BLD AUTO: 171 K/UL — SIGNIFICANT CHANGE UP (ref 150–400)
POTASSIUM SERPL-MCNC: 3.7 MMOL/L — SIGNIFICANT CHANGE UP (ref 3.5–5.3)
POTASSIUM SERPL-SCNC: 3.7 MMOL/L — SIGNIFICANT CHANGE UP (ref 3.5–5.3)
PROCALCITONIN SERPL-MCNC: <0.05 — SIGNIFICANT CHANGE UP (ref 0–0.04)
PROT SERPL-MCNC: 7.5 G/DL — SIGNIFICANT CHANGE UP (ref 6–8.3)
PROT SERPL-MCNC: 7.9 G/DL — SIGNIFICANT CHANGE UP (ref 6–8.3)
PROTHROM AB SERPL-ACNC: 15.9 SEC — HIGH (ref 10.6–13.6)
RBC # BLD: 4.7 M/UL — SIGNIFICANT CHANGE UP (ref 4.2–5.8)
RBC # FLD: 14.4 % — SIGNIFICANT CHANGE UP (ref 10.3–14.5)
SARS-COV-2 RNA SPEC QL NAA+PROBE: DETECTED
SARS-COV-2 RNA SPEC QL NAA+PROBE: DETECTED
SODIUM SERPL-SCNC: 140 MMOL/L — SIGNIFICANT CHANGE UP (ref 135–145)
TROPONIN I SERPL-MCNC: <.015 NG/ML — SIGNIFICANT CHANGE UP (ref 0.01–0.04)
WBC # BLD: 6.49 K/UL — SIGNIFICANT CHANGE UP (ref 3.8–10.5)
WBC # FLD AUTO: 6.49 K/UL — SIGNIFICANT CHANGE UP (ref 3.8–10.5)

## 2021-10-05 PROCEDURE — 93010 ELECTROCARDIOGRAM REPORT: CPT

## 2021-10-05 PROCEDURE — 99223 1ST HOSP IP/OBS HIGH 75: CPT

## 2021-10-05 PROCEDURE — 71045 X-RAY EXAM CHEST 1 VIEW: CPT | Mod: 26

## 2021-10-05 PROCEDURE — 99223 1ST HOSP IP/OBS HIGH 75: CPT | Mod: GC

## 2021-10-05 PROCEDURE — 99285 EMERGENCY DEPT VISIT HI MDM: CPT | Mod: CS

## 2021-10-05 RX ORDER — SODIUM CHLORIDE 9 MG/ML
1000 INJECTION INTRAMUSCULAR; INTRAVENOUS; SUBCUTANEOUS ONCE
Refills: 0 | Status: COMPLETED | OUTPATIENT
Start: 2021-10-05 | End: 2021-10-05

## 2021-10-05 RX ORDER — ASPIRIN/CALCIUM CARB/MAGNESIUM 324 MG
81 TABLET ORAL DAILY
Refills: 0 | Status: DISCONTINUED | OUTPATIENT
Start: 2021-10-05 | End: 2021-10-19

## 2021-10-05 RX ORDER — REMDESIVIR 5 MG/ML
100 INJECTION INTRAVENOUS EVERY 24 HOURS
Refills: 0 | Status: COMPLETED | OUTPATIENT
Start: 2021-10-06 | End: 2021-10-09

## 2021-10-05 RX ORDER — CLOPIDOGREL BISULFATE 75 MG/1
75 TABLET, FILM COATED ORAL DAILY
Refills: 0 | Status: DISCONTINUED | OUTPATIENT
Start: 2021-10-05 | End: 2021-10-19

## 2021-10-05 RX ORDER — ENOXAPARIN SODIUM 100 MG/ML
40 INJECTION SUBCUTANEOUS EVERY 12 HOURS
Refills: 0 | Status: DISCONTINUED | OUTPATIENT
Start: 2021-10-05 | End: 2021-10-19

## 2021-10-05 RX ORDER — REMDESIVIR 5 MG/ML
200 INJECTION INTRAVENOUS EVERY 24 HOURS
Refills: 0 | Status: COMPLETED | OUTPATIENT
Start: 2021-10-05 | End: 2021-10-05

## 2021-10-05 RX ORDER — REMDESIVIR 5 MG/ML
INJECTION INTRAVENOUS
Refills: 0 | Status: COMPLETED | OUTPATIENT
Start: 2021-10-05 | End: 2021-10-09

## 2021-10-05 RX ORDER — DEXAMETHASONE 0.5 MG/5ML
6 ELIXIR ORAL DAILY
Refills: 0 | Status: COMPLETED | OUTPATIENT
Start: 2021-10-05 | End: 2021-10-15

## 2021-10-05 RX ORDER — ATORVASTATIN CALCIUM 80 MG/1
20 TABLET, FILM COATED ORAL AT BEDTIME
Refills: 0 | Status: DISCONTINUED | OUTPATIENT
Start: 2021-10-05 | End: 2021-10-19

## 2021-10-05 RX ADMIN — Medication 81 MILLIGRAM(S): at 22:36

## 2021-10-05 RX ADMIN — CLOPIDOGREL BISULFATE 75 MILLIGRAM(S): 75 TABLET, FILM COATED ORAL at 22:36

## 2021-10-05 RX ADMIN — ENOXAPARIN SODIUM 40 MILLIGRAM(S): 100 INJECTION SUBCUTANEOUS at 18:40

## 2021-10-05 RX ADMIN — SODIUM CHLORIDE 1000 MILLILITER(S): 9 INJECTION INTRAMUSCULAR; INTRAVENOUS; SUBCUTANEOUS at 17:22

## 2021-10-05 RX ADMIN — ATORVASTATIN CALCIUM 20 MILLIGRAM(S): 80 TABLET, FILM COATED ORAL at 22:33

## 2021-10-05 RX ADMIN — SODIUM CHLORIDE 1000 MILLILITER(S): 9 INJECTION INTRAMUSCULAR; INTRAVENOUS; SUBCUTANEOUS at 14:16

## 2021-10-05 RX ADMIN — REMDESIVIR 500 MILLIGRAM(S): 5 INJECTION INTRAVENOUS at 22:55

## 2021-10-05 RX ADMIN — Medication 6 MILLIGRAM(S): at 22:36

## 2021-10-05 NOTE — H&P ADULT - ASSESSMENT
Patient is a 89 y/o M with PMH of TIA s/p R endarterectomy (2011), hyperlipidemia, hiatal hernia, GERD admitted for COVID-19.

## 2021-10-05 NOTE — H&P ADULT - NSHPLANGTRANSLATORFT_GEN_A_CORE
Patient speaks English, [place]  ID#[id number] was used for completeness sake Patient speaks English, [place]  ID#[id number] was used as well

## 2021-10-05 NOTE — ED PROVIDER NOTE - ATTENDING CONTRIBUTION TO CARE
89 yo male who presented to er yest for cough and malaise not feeling well diagnosed with covid  today he was very short of breath found to have oxygen sats in the 80's  unk covid exposure feels weak  on eval  elderly male awake alert no distress with nasal oxygen keeping sats to 94%  heent nc at [facility] perrla no g f r  cor rrr chest cta with coarse bs bl  no distress  abd obese soft nt nd no hsm with no visible scars  ext normal   neuro tired but no focal  skin normal  plan admit for covid hypoxia ro pe ro acs ro  chf isolation  steroids neb cough med monitor
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
## 2021-10-05 NOTE — ED PROVIDER NOTE - CLINICAL SUMMARY MEDICAL DECISION MAKING FREE TEXT BOX
BIBA due to vomiting and SOB x 2 days. pt reports he feels general weakness, body aches, headache, and vomiting. Pt reports no known COVID exposure. pt was seen in ED yesterday in which his COVID was positive. pt currently on nasal cannula. 89% on room air. plan includes labs, CXR r/o pneumonia, admission

## 2021-10-05 NOTE — H&P ADULT - PROBLEM SELECTOR PLAN 4
DVT Prophylaxis:  - Lovenox 40mg q12 DVT Prophylaxis:  - Lovenox 40mg q12. Pending d-dimer level will consider increasing to therapeutic dosing of lovenox

## 2021-10-05 NOTE — H&P ADULT - PROBLEM SELECTOR PLAN 1
Acute hypoxic respiratory failure 2/2 confirmed COVID-19 infection  - supp O2 prn to maintain O2 sats >88%. Prone PRN  - hold decadron 6mg IV as patient without documented desaturations  - will hold off on/start remdesivir, GFR >30 and ALT not > than 5x upper limit of normal.  - monitor volume status closely, avoid aggressive hydration  - tylenol prn myalgias, fever  - Avoid nebulizers. continue with metered dose inhalers prn.  - QTc:   - daily labs: CBC with diff and CMP  - ferritin, crp, d-dimer, procal at admission then will repeat If clinically worsening every 48-72 hours.  - will initiate VTE ppx: lovenox 40mg BID  - advanced care planning/code status: Acute hypoxic respiratory failure 2/2 confirmed COVID-19 infection  - supp O2 prn to maintain O2 sats >88%. Prone PRN  - hold decadron 6mg IV as patient without documented desaturations  - will hold off on/start remdesivir, GFR >30 and ALT not > than 5x upper limit of normal.  - monitor volume status closely, avoid aggressive hydration  - tylenol prn myalgias, fever  - Avoid nebulizers. continue with metered dose inhalers prn.  - QTc: 497ms  - daily labs: CBC with diff and CMP  - ferritin, crp, d-dimer, procal at admission then will repeat If clinically worsening every 48-72 hours.  - will initiate VTE ppx: lovenox 40mg BID  - advanced care planning/code status: Acute hypoxic respiratory failure 2/2 confirmed COVID-19 infection  - supp O2 prn to maintain O2 sats >88%. Prone PRN  - start Decadron and Remdesvir  - monitor volume status closely, avoid aggressive hydration  - tylenol prn myalgias, fever  - Avoid nebulizers. continue with metered dose inhalers prn.  - QTc: 497ms  - daily labs: CBC with diff and CMP  - ferritin, crp, d-dimer, procal at admission then will repeat If clinically worsening every 48-72 hours.  - will initiate VTE ppx: lovenox 40mg BID

## 2021-10-05 NOTE — H&P ADULT - NSICDXPASTMEDICALHX_GEN_ALL_CORE_FT
PAST MEDICAL HISTORY:  HLD (hyperlipidemia)      PAST MEDICAL HISTORY:  CAD (coronary artery disease)     HLD (hyperlipidemia)     Osteoarthritis

## 2021-10-05 NOTE — H&P ADULT - NSHPSOCIALHISTORY_GEN_ALL_CORE
Tobacco: quit smoking in 1954  EtOH: quit drinking alcohol in 1960  Recreational drug use: denies  Lives with: alone  Ambulates: independent  ADLs: independent  Vaccinations: COVID vaccine in 7/2021,  unknown

## 2021-10-05 NOTE — CONSULT NOTE ADULT - SUBJECTIVE AND OBJECTIVE BOX
[facility]  INFECTIOUS DISEASES   [address] [place] [address]  Tel: [phone]     Fax: [phone]  =======================================================    MRN-[id number]  JUSTIN DEY     Tristanian  [id number]    CC: COVID 19  HPI: 89y/o man with PMH of CAD, and OA was admitted today with vomiting and SOB for two days. he also has generalized weakness, body aches and headache. He recently came back from [place] and states  that he had contact with Known COVID case in [place]. He was seen in ED on 10/4 and had a positive COVID test. No chest pain, fever, chills, diarrhea, dysuria or any other symptoms. Currently in ED on 4L O2 with NC.  Pt reports no known COVID exposure. pt was seen in ED yesterday in which his COVID was positive. pt denies fever, abd pain, dysuria, hematuria, chest pain, hemoptysis, or any other complaints.  In the ED,  temp 98.8F, O2 91 on RA -> 95% on NC.    PAST MEDICAL & SURGICAL HISTORY:  HLD (hyperlipidemia)  Osteoarthritis  CAD (coronary artery disease)  H/O rotator cuff surgery  R shoulder    Social Hx: no smoking, ETOH or drugs     FAMILY HISTORY:  No pertinent family history in first degree relatives      Allergies  No Known Allergies    Antibiotics:  None      REVIEW OF SYSTEMS:  CONSTITUTIONAL:  No Fever or chills  HEENT:  No diplopia or blurred vision.  No sore throat or runny nose.  CARDIOVASCULAR:  No chest pain or SOB.  RESPIRATORY:  No cough, shortness of breath, PND or orthopnea.  GASTROINTESTINAL:  No nausea, vomiting or diarrhea.  GENITOURINARY:  No dysuria, frequency or urgency. No Blood in urine  MUSCULOSKELETAL:  no joint aches, no muscle pain  SKIN:  No change in skin, hair or nails.  NEUROLOGIC:  No paresthesias, fasciculations, seizures or weakness.  PSYCHIATRIC:  No disorder of thought or mood.  ENDOCRINE:  No heat or cold intolerance, polyuria or polydipsia.  HEMATOLOGICAL:  No easy bruising or bleeding.     Physical Exam:  Vital Signs Last 24 Hrs  T(C): 37 (05 Oct 2021 15:52), Max: 37.1 (04 Oct 2021 17:31)  T(F): 98.6 (05 Oct 2021 15:52), Max: 98.8 (04 Oct 2021 17:31)  HR: 82 (05 Oct 2021 15:52) (82 - 92)  BP: 121/65 (05 Oct 2021 15:52) (118/67 - 155/74)  BP(mean): --  RR: 16 (05 Oct 2021 15:52) (16 - 18)  SpO2: 95% (05 Oct 2021 15:52) (91% - 95%)  Height (cm): 162.6 (10-05 @ 12:28)  Weight (kg): 102.1 (10-05 @ 12:28)  BMI (kg/m2): 38.6 (10-05 @ 12:28)  BSA (m2): 2.06 (10-05 @ 12:28)  GEN: NAD, obese   HEENT: normocephalic and atraumatic. EOMI. PERRL.    NECK: Supple.  No lymphadenopathy   LUNGS: scattered rhonchi   HEART: Regular rate and rhythm   ABDOMEN: Soft, nontender, and nondistended.  Positive bowel sounds.    : No CVA tenderness  EXTREMITIES: Without any cyanosis, clubbing, rash, lesions or edema.  NEUROLOGIC: grossly intact.  PSYCHIATRIC: Appropriate affect .  SKIN: No ulceration or induration present.    Labs:  10-05    140  |  108  |  17  ----------------------------<  136<H>  3.7   |  27  |  0.99    Ca    8.3<L>      05 Oct 2021 13:22    TPro  7.9  /  Alb  3.5  /  TBili  0.4  /  DBili  x   /  AST  31  /  ALT  34  /  AlkPhos  224<H>  10-05                        13.8   6.49  )-----------( 171      ( 05 Oct 2021 13:22 )             42.6     Urinalysis Basic - ( 04 Oct 2021 15:48 )    Color: Yellow / Appearance: Clear / S.015 / pH: x  Gluc: x / Ketone: Negative  / Bili: Negative / Urobili: Negative   Blood: x / Protein: Negative / Nitrite: Negative   Leuk Esterase: Negative / RBC: 3-5 /HPF / WBC x   Sq Epi: x / Non Sq Epi: Occasional / Bacteria: Occasional    LIVER FUNCTIONS - ( 05 Oct 2021 13:22 )  Alb: 3.5 g/dL / Pro: 7.9 g/dL / ALK PHOS: 224 U/L / ALT: 34 U/L / AST: 31 U/L / GGT: x           CARDIAC MARKERS ( 05 Oct 2021 13:22 )  <.015 ng/mL / x     / x     / x     / x      CARDIAC MARKERS ( 04 Oct 2021 12:44 )  <.015 ng/mL / x     / x     / x     / x        Procalcitonin, Serum: <0.05 (10-05-21 @ 13:22)    COVID-19 PCR: Detected (10-05-21 @ 13:16)  COVID-19 PCR: Detected (10-04-21 @ 12:44)    All imaging and other data have been reviewed.  < from: Xray Chest 1 View- PORTABLE-Urgent (10.04.21 @ 12:41) >  EXAM:  XR CHEST PORTABLE URGENT 1V                        PROCEDURE DATE:  10/04/2021    INTERPRETATION:  Weakness.  AP chest.  Heart magnified by projection. Calcified aortic arch.  streaky fibrotic/atelectatic changes left base withmild elevation left hemidiaphragm. No consolidation or effusion.  IMPRESSION: No active infiltrates.    Assessment and Plan:   89y/o man with PMH of CAD, and OA was admitted today with vomiting and SOB for two days. he also has generalized weakness, body aches and headache. He recently came back from [place] and states  that he had contact with Known COVID case in [place]. He was seen in ED on 10/4 and had a positive COVID test.  Treatment usually is different case by case, data suggest that these might work:   Remdisivir:  5 day course , ALT < 5X ULN  Steroid: For hypoxic patients on supplemental O2 of intubated. dexamethasone 6mg PO or IV Q-day x 10 days (equivalent to solumedrol 32mg IV or Prednisone 40mg)  Anticoagulation:  with prophylactic dosing, full dose to be considered in patients with increased risk for thromboembolic complications. Bleeding can happen but acceptable in high risk patients due to hypercoagulable state.  LMWH is good, for high risk patients consider discharge on oral anticoagulation with rivaroxaban (Xarelto) 10mg PO QD or Eliquis (apixaban) 2.5-5mg PO BID  x 4 weeks.  Currently data and recommendations for COVID-19 treatment are rapidly changing, so this treatment plan is based on my clinical judgement with available information.     COVID 19   - BMP, CBC w diff, NLR. Procalcitonin, Ferritin, CRP, LDH and D dimer for the start and periodically can be repeated.   - CXR with bilateral opacities more consistent with viral pneumonia   - Start Remdesivir 200mg stat and 100mg daily for 4 more days (total 5days) or until discharge whichever comes first.   - Start Dexamethasone 6mg po daily for 10days  - Follow Creat and LFTs daily while on Remdesivir.    - Watch O2 sat closely and taper as tolerated.   - No antibiotics at this time.  - Anticoagulation as per protocol  - No indication for Tocilizumab and Monoclonal Ab at this time.     Thank you for courtesy of this consult.     Will follow.   D/W Dr. Garza.     Dr. Salas Hernandez   Infectious Diseases   Please call [phone] between 8am and 6pm, call [phone] after 6pm or weekends.   [facility]  INFECTIOUS DISEASES   [address] [place] [address]  Tel: [phone]     Fax: [phone]  =======================================================    MRN-[id number]  JUSTIN DEY     [place]in[place]  [id number]    CC: COVID 19  HPI: 89y/o man with PMH of CAD, and OA was admitted today with vomiting and SOB for two days. he also has generalized weakness, body aches and headache. He recently came back from [place] and states  that he had contact with Known COVID case in [place]. He was seen in ED on 10/4 and had a positive COVID test. No chest pain, fever, chills, diarrhea, dysuria or any other symptoms. Currently in ED on 4L O2 with NC.  Pt reports no known COVID exposure. pt was seen in ED yesterday in which his COVID was positive. pt denies fever, abd pain, dysuria, hematuria, chest pain, hemoptysis, or any other complaints.  In the ED,  temp 98.8F, O2 91 on RA -> 95% on NC.    PAST MEDICAL & SURGICAL HISTORY:  HLD (hyperlipidemia)  Osteoarthritis  CAD (coronary artery disease)  H/O rotator cuff surgery  R shoulder    Social Hx: no smoking, ETOH or drugs     FAMILY HISTORY:  No pertinent family history in first degree relatives      Allergies  No Known Allergies    Antibiotics:  None      REVIEW OF SYSTEMS:  CONSTITUTIONAL:  No Fever or chills  HEENT:  No diplopia or blurred vision.  No sore throat or runny nose.  CARDIOVASCULAR:  No chest pain or SOB.  RESPIRATORY:  No cough, shortness of breath, PND or orthopnea.  GASTROINTESTINAL:  No nausea, vomiting or diarrhea.  GENITOURINARY:  No dysuria, frequency or urgency. No Blood in urine  MUSCULOSKELETAL:  no joint aches, no muscle pain  SKIN:  No change in skin, hair or nails.  NEUROLOGIC:  No paresthesias, fasciculations, seizures or weakness.  PSYCHIATRIC:  No disorder of thought or mood.  ENDOCRINE:  No heat or cold intolerance, polyuria or polydipsia.  HEMATOLOGICAL:  No easy bruising or bleeding.     Physical Exam:  Vital Signs Last 24 Hrs  T(C): 37 (05 Oct 2021 15:52), Max: 37.1 (04 Oct 2021 17:31)  T(F): 98.6 (05 Oct 2021 15:52), Max: 98.8 (04 Oct 2021 17:31)  HR: 82 (05 Oct 2021 15:52) (82 - 92)  BP: 121/65 (05 Oct 2021 15:52) (118/67 - 155/74)  BP(mean): --  RR: 16 (05 Oct 2021 15:52) (16 - 18)  SpO2: 95% (05 Oct 2021 15:52) (91% - 95%)  Height (cm): 162.6 (10-05 @ 12:28)  Weight (kg): 102.1 (10-05 @ 12:28)  BMI (kg/m2): 38.6 (10-05 @ 12:28)  BSA (m2): 2.06 (10-05 @ 12:28)  GEN: NAD, obese   HEENT: normocephalic and atraumatic. EOMI. PERRL.    NECK: Supple.  No lymphadenopathy   LUNGS: scattered rhonchi   HEART: Regular rate and rhythm   ABDOMEN: Soft, nontender, and nondistended.  Positive bowel sounds.    : No CVA tenderness  EXTREMITIES: Without any cyanosis, clubbing, rash, lesions or edema.  NEUROLOGIC: grossly intact.  PSYCHIATRIC: Appropriate affect .  SKIN: No ulceration or induration present.    Labs:  10-05    140  |  108  |  17  ----------------------------<  136<H>  3.7   |  27  |  0.99    Ca    8.3<L>      05 Oct 2021 13:22    TPro  7.9  /  Alb  3.5  /  TBili  0.4  /  DBili  x   /  AST  31  /  ALT  34  /  AlkPhos  224<H>  10-05                        13.8   6.49  )-----------( 171      ( 05 Oct 2021 13:22 )             42.6     Urinalysis Basic - ( 04 Oct 2021 15:48 )    Color: Yellow / Appearance: Clear / S.015 / pH: x  Gluc: x / Ketone: Negative  / Bili: Negative / Urobili: Negative   Blood: x / Protein: Negative / Nitrite: Negative   Leuk Esterase: Negative / RBC: 3-5 /HPF / WBC x   Sq Epi: x / Non Sq Epi: Occasional / Bacteria: Occasional    LIVER FUNCTIONS - ( 05 Oct 2021 13:22 )  Alb: 3.5 g/dL / Pro: 7.9 g/dL / ALK PHOS: 224 U/L / ALT: 34 U/L / AST: 31 U/L / GGT: x           CARDIAC MARKERS ( 05 Oct 2021 13:22 )  <.015 ng/mL / x     / x     / x     / x      CARDIAC MARKERS ( 04 Oct 2021 12:44 )  <.015 ng/mL / x     / x     / x     / x        Procalcitonin, Serum: <0.05 (10-05-21 @ 13:22)    COVID-19 PCR: Detected (10-05-21 @ 13:16)  COVID-19 PCR: Detected (10-04-21 @ 12:44)    All imaging and other data have been reviewed.  < from: Xray Chest 1 View- PORTABLE-Urgent (10.04.21 @ 12:41) >  EXAM:  XR CHEST PORTABLE URGENT 1V                        PROCEDURE DATE:  10/04/2021    INTERPRETATION:  Weakness.  AP chest.  Heart magnified by projection. Calcified aortic arch.  streaky fibrotic/atelectatic changes left base withmild elevation left hemidiaphragm. No consolidation or effusion.  IMPRESSION: No active infiltrates.    Assessment and Plan:   89y/o man with PMH of CAD, and OA was admitted today with vomiting and SOB for two days. he also has generalized weakness, body aches and headache. He recently came back from [place] and states  that he had contact with Known COVID case in [place]. He was seen in ED on 10/4 and had a positive COVID test.  Treatment usually is different case by case, data suggest that these might work:   Remdisivir:  5 day course , ALT < 5X ULN  Steroid: For hypoxic patients on supplemental O2 of intubated. dexamethasone 6mg PO or IV Q-day x 10 days (equivalent to solumedrol 32mg IV or Prednisone 40mg)  Anticoagulation:  with prophylactic dosing, full dose to be considered in patients with increased risk for thromboembolic complications. Bleeding can happen but acceptable in high risk patients due to hypercoagulable state.  LMWH is good, for high risk patients consider discharge on oral anticoagulation with rivaroxaban (Xarelto) 10mg PO QD or Eliquis (apixaban) 2.5-5mg PO BID  x 4 weeks.  Currently data and recommendations for COVID-19 treatment are rapidly changing, so this treatment plan is based on my clinical judgement with available information.     COVID 19   - BMP, CBC w diff, NLR. Procalcitonin, Ferritin, CRP, LDH and D dimer for the start and periodically can be repeated.   - CXR with no opacities   - Start Remdesivir 200mg stat and 100mg daily for 4 more days (total 5days) or until discharge whichever comes first.   - Start Dexamethasone 6mg po daily for 10days  - Follow Creat and LFTs daily while on Remdesivir.    - Watch O2 sat closely and taper as tolerated.   - No antibiotics at this time.  - Anticoagulation as per protocol  - No indication for Tocilizumab and Monoclonal Ab at this time.     Thank you for courtesy of this consult.     Will follow.   D/W Dr. Garza.     Dr. Salas Hernandez   Infectious Diseases   Please call [phone] between 8am and 6pm, call [phone] after 6pm or weekends.

## 2021-10-05 NOTE — ED ADULT NURSE NOTE - OBJECTIVE STATEMENT
Pt. received alert and oriented x3 with chief complaint of N/V/SOB. Pt. tested positive for covid 1 day ago.

## 2021-10-05 NOTE — H&P ADULT - ATTENDING COMMENTS
Patient is a 89 y/o M with PMH of TIA s/p R endarterectomy (2011), hyperlipidemia, hiatal hernia, GERD admitted for COVID-19.    T(C): 37 (10-05-21 @ 15:52), Max: 37.1 (10-05-21 @ 12:28)  HR: 82 (10-05-21 @ 15:52) (82 - 92)  BP: 121/65 (10-05-21 @ 15:52) (118/67 - 121/65)  RR: 16 (10-05-21 @ 15:52) (16 - 18)  SpO2: 95% (10-05-21 @ 15:52) (91% - 95%)  Wt(kg): --    Physical Exam:   GENERAL: well-groomed, well-developed, NAD  HEENT: head NC/AT; EOM intact, conjunctiva & sclera clear; hearing grossly intact, moist mucous membranes  NECK: supple, no JVD  RESPIRATORY: rhonchi b/l lung bases, no rales  CARDIOVASCULAR: S1&S2, RRR, no murmurs or gallops  ABDOMEN: soft, non-tender, non-distended, + Bowel sounds x4 quadrants, no guarding, rebound or rigidity  MUSCULOSKELETAL:  no clubbing, cyanosis or edema of all 4 extremities  LYMPH: no cervical lymphadenopathy  VASCULAR: Radial pulses 2+ bilaterally, no varicose veins   SKIN: warm and dry, color normal  NEUROLOGIC: AA&O X3, CN2-12 intact w/ no focal deficits, no sensory loss, motor Strength 5/5 in UE & LE B/L  Psych: Normal mood and affect, normal behavior    Plan:   Acute hypoxic respiratory failure 2.2 COVID-19 infection: start remdesvir and decadron.   -monitor on pulse oximetery.   -discussed with infectious disease.

## 2021-10-05 NOTE — H&P ADULT - NSHPPHYSICALEXAM_GEN_ALL_CORE
VITALS:   T(C): 37 (10-05-21 @ 15:52), Max: 37.1 (10-04-21 @ 17:31)  HR: 82 (10-05-21 @ 15:52) (82 - 92)  BP: 121/65 (10-05-21 @ 15:52) (118/67 - 155/74)  RR: 16 (10-05-21 @ 15:52) (16 - 18)  SpO2: 95% (10-05-21 @ 15:52) (91% - 95%)    GENERAL: NAD, lying in bed comfortably, large body habitus  HEAD: Atraumatic, Normocephalic  EYES: EOMI, PERRLA, conjunctiva and sclera clear  ENT: Moist mucous membranes  NECK: Supple, No JVD  CHEST/LUNG: decreased breath sounds b/l; No rales, rhonchi, wheezing, or rubs. Mildly labored respirations  HEART: Regular rate and rhythm; No murmurs, rubs, or gallops  ABDOMEN: BSx4; Soft, nontender, obese  EXTREMITIES:  2+ Peripheral Pulses. No clubbing, cyanosis, or edema  NERVOUS SYSTEM:  A&Ox3, no focal deficits   SKIN: No rashes or lesions

## 2021-10-05 NOTE — H&P ADULT - NSHPREVIEWOFSYSTEMS_GEN_ALL_CORE
General: admits fever, chills, denies weight gain or weight loss, changes in appetite  HEENT: admits to cough and headache, denies nasal congestion, rhinorrhea, sore throat  Cardio: no palpitations, pallor, chest pain or discomfort  Pulm: admits to shortness of breath  GI: no vomiting, diarrhea, abdominal pain, constipation   /Renal: no dysuria, foul smelling urine, increased frequency, flank pain  MSK: admits to myalgias and joint pain 2/2 osteoarthritis, no edema, gait changes  Endo: no temperature intolerance  Heme: no bruising or abnormal bleeding  Skin: no rash General: admits fever, chills, denies weight gain or weight loss, changes in appetite  HEENT: admits to cough and headache, denies nasal congestion, rhinorrhea, sore throat  Cardio: no palpitations, pallor, chest pain or discomfort  Pulm: admits to shortness of breath  GI: no vomiting, diarrhea, abdominal pain, constipation, melena, hematochezia  /Renal: no dysuria, foul smelling urine, increased frequency, flank pain  MSK: admits to myalgias and joint pain 2/2 osteoarthritis, no edema, gait changes  Endo: no temperature intolerance  Heme: no bruising or abnormal bleeding  Skin: no rash

## 2021-10-05 NOTE — ED PROVIDER NOTE - OBJECTIVE STATEMENT
89 y/o male with PMHx HLD BIBA due to vomiting and SOB x 2 days. pt reports he feels general weakness, body aches, headache, and vomiting. Pt reports no known COVID exposure. pt was seen in ED yesterday in which his COVID was positive. pt denies fever, abd pain, dysuria, hematuria, chest pain, hemoptysis, or any other complaints.

## 2021-10-06 LAB
ALBUMIN SERPL ELPH-MCNC: 3.4 G/DL — SIGNIFICANT CHANGE UP (ref 3.3–5)
ALBUMIN SERPL ELPH-MCNC: 3.4 G/DL — SIGNIFICANT CHANGE UP (ref 3.3–5)
ALP SERPL-CCNC: 217 U/L — HIGH (ref 40–120)
ALP SERPL-CCNC: 228 U/L — HIGH (ref 40–120)
ALT FLD-CCNC: 37 U/L — SIGNIFICANT CHANGE UP (ref 12–78)
ALT FLD-CCNC: 38 U/L — SIGNIFICANT CHANGE UP (ref 12–78)
ANION GAP SERPL CALC-SCNC: 7 MMOL/L — SIGNIFICANT CHANGE UP (ref 5–17)
AST SERPL-CCNC: 29 U/L — SIGNIFICANT CHANGE UP (ref 15–37)
AST SERPL-CCNC: 31 U/L — SIGNIFICANT CHANGE UP (ref 15–37)
BASOPHILS # BLD AUTO: 0 K/UL — SIGNIFICANT CHANGE UP (ref 0–0.2)
BASOPHILS NFR BLD AUTO: 0 % — SIGNIFICANT CHANGE UP (ref 0–2)
BILIRUB DIRECT SERPL-MCNC: 0.1 MG/DL — SIGNIFICANT CHANGE UP (ref 0.05–0.2)
BILIRUB INDIRECT FLD-MCNC: 0.2 MG/DL — SIGNIFICANT CHANGE UP (ref 0.2–1)
BILIRUB SERPL-MCNC: 0.3 MG/DL — SIGNIFICANT CHANGE UP (ref 0.2–1.2)
BILIRUB SERPL-MCNC: 0.3 MG/DL — SIGNIFICANT CHANGE UP (ref 0.2–1.2)
BUN SERPL-MCNC: 17 MG/DL — SIGNIFICANT CHANGE UP (ref 7–23)
CALCIUM SERPL-MCNC: 8.7 MG/DL — SIGNIFICANT CHANGE UP (ref 8.5–10.1)
CHLORIDE SERPL-SCNC: 107 MMOL/L — SIGNIFICANT CHANGE UP (ref 96–108)
CO2 SERPL-SCNC: 26 MMOL/L — SIGNIFICANT CHANGE UP (ref 22–31)
COVID-19 SPIKE DOMAIN AB INTERP: POSITIVE
COVID-19 SPIKE DOMAIN ANTIBODY RESULT: >250 U/ML — HIGH
CREAT SERPL-MCNC: 0.91 MG/DL — SIGNIFICANT CHANGE UP (ref 0.5–1.3)
CRP SERPL-MCNC: 13 MG/L — HIGH
CULTURE RESULTS: SIGNIFICANT CHANGE UP
EOSINOPHIL # BLD AUTO: 0 K/UL — SIGNIFICANT CHANGE UP (ref 0–0.5)
EOSINOPHIL NFR BLD AUTO: 0 % — SIGNIFICANT CHANGE UP (ref 0–6)
GLUCOSE SERPL-MCNC: 152 MG/DL — HIGH (ref 70–99)
HCT VFR BLD CALC: 45.7 % — SIGNIFICANT CHANGE UP (ref 39–50)
HGB BLD-MCNC: 14.8 G/DL — SIGNIFICANT CHANGE UP (ref 13–17)
IMM GRANULOCYTES NFR BLD AUTO: 0.4 % — SIGNIFICANT CHANGE UP (ref 0–1.5)
LYMPHOCYTES # BLD AUTO: 0.76 K/UL — LOW (ref 1–3.3)
LYMPHOCYTES # BLD AUTO: 13.7 % — SIGNIFICANT CHANGE UP (ref 13–44)
MAGNESIUM SERPL-MCNC: 2.2 MG/DL — SIGNIFICANT CHANGE UP (ref 1.6–2.6)
MCHC RBC-ENTMCNC: 30.2 PG — SIGNIFICANT CHANGE UP (ref 27–34)
MCHC RBC-ENTMCNC: 32.4 GM/DL — SIGNIFICANT CHANGE UP (ref 32–36)
MCV RBC AUTO: 93.3 FL — SIGNIFICANT CHANGE UP (ref 80–100)
MONOCYTES # BLD AUTO: 0.19 K/UL — SIGNIFICANT CHANGE UP (ref 0–0.9)
MONOCYTES NFR BLD AUTO: 3.4 % — SIGNIFICANT CHANGE UP (ref 2–14)
NEUTROPHILS # BLD AUTO: 4.59 K/UL — SIGNIFICANT CHANGE UP (ref 1.8–7.4)
NEUTROPHILS NFR BLD AUTO: 82.5 % — HIGH (ref 43–77)
NRBC # BLD: 0 /100 WBCS — SIGNIFICANT CHANGE UP (ref 0–0)
PHOSPHATE SERPL-MCNC: 3.5 MG/DL — SIGNIFICANT CHANGE UP (ref 2.5–4.5)
PLATELET # BLD AUTO: 159 K/UL — SIGNIFICANT CHANGE UP (ref 150–400)
POTASSIUM SERPL-MCNC: 4 MMOL/L — SIGNIFICANT CHANGE UP (ref 3.5–5.3)
POTASSIUM SERPL-SCNC: 4 MMOL/L — SIGNIFICANT CHANGE UP (ref 3.5–5.3)
PROT SERPL-MCNC: 8 G/DL — SIGNIFICANT CHANGE UP (ref 6–8.3)
PROT SERPL-MCNC: 8.2 G/DL — SIGNIFICANT CHANGE UP (ref 6–8.3)
RBC # BLD: 4.9 M/UL — SIGNIFICANT CHANGE UP (ref 4.2–5.8)
RBC # FLD: 14.2 % — SIGNIFICANT CHANGE UP (ref 10.3–14.5)
SARS-COV-2 IGG+IGM SERPL QL IA: >250 U/ML — HIGH
SARS-COV-2 IGG+IGM SERPL QL IA: POSITIVE
SODIUM SERPL-SCNC: 140 MMOL/L — SIGNIFICANT CHANGE UP (ref 135–145)
SPECIMEN SOURCE: SIGNIFICANT CHANGE UP
WBC # BLD: 5.56 K/UL — SIGNIFICANT CHANGE UP (ref 3.8–10.5)
WBC # FLD AUTO: 5.56 K/UL — SIGNIFICANT CHANGE UP (ref 3.8–10.5)

## 2021-10-06 PROCEDURE — 99232 SBSQ HOSP IP/OBS MODERATE 35: CPT

## 2021-10-06 PROCEDURE — 99233 SBSQ HOSP IP/OBS HIGH 50: CPT

## 2021-10-06 RX ORDER — LANOLIN ALCOHOL/MO/W.PET/CERES
5 CREAM (GRAM) TOPICAL AT BEDTIME
Refills: 0 | Status: DISCONTINUED | OUTPATIENT
Start: 2021-10-06 | End: 2021-10-19

## 2021-10-06 RX ORDER — ALBUTEROL 90 UG/1
2 AEROSOL, METERED ORAL EVERY 6 HOURS
Refills: 0 | Status: DISCONTINUED | OUTPATIENT
Start: 2021-10-06 | End: 2021-10-19

## 2021-10-06 RX ORDER — ACETAMINOPHEN 500 MG
650 TABLET ORAL ONCE
Refills: 0 | Status: COMPLETED | OUTPATIENT
Start: 2021-10-06 | End: 2021-10-06

## 2021-10-06 RX ORDER — ACETAMINOPHEN 500 MG
650 TABLET ORAL EVERY 6 HOURS
Refills: 0 | Status: DISCONTINUED | OUTPATIENT
Start: 2021-10-06 | End: 2021-10-19

## 2021-10-06 RX ORDER — GUAIFENESIN/DEXTROMETHORPHAN 600MG-30MG
5 TABLET, EXTENDED RELEASE 12 HR ORAL EVERY 6 HOURS
Refills: 0 | Status: DISCONTINUED | OUTPATIENT
Start: 2021-10-06 | End: 2021-10-19

## 2021-10-06 RX ADMIN — Medication 5 MILLILITER(S): at 13:03

## 2021-10-06 RX ADMIN — Medication 6 MILLIGRAM(S): at 06:49

## 2021-10-06 RX ADMIN — Medication 5 MILLIGRAM(S): at 23:51

## 2021-10-06 RX ADMIN — Medication 81 MILLIGRAM(S): at 11:15

## 2021-10-06 RX ADMIN — CLOPIDOGREL BISULFATE 75 MILLIGRAM(S): 75 TABLET, FILM COATED ORAL at 11:16

## 2021-10-06 RX ADMIN — ENOXAPARIN SODIUM 40 MILLIGRAM(S): 100 INJECTION SUBCUTANEOUS at 17:03

## 2021-10-06 RX ADMIN — ATORVASTATIN CALCIUM 20 MILLIGRAM(S): 80 TABLET, FILM COATED ORAL at 21:12

## 2021-10-06 RX ADMIN — Medication 5 MILLILITER(S): at 23:54

## 2021-10-06 RX ADMIN — ALBUTEROL 2 PUFF(S): 90 AEROSOL, METERED ORAL at 19:31

## 2021-10-06 RX ADMIN — Medication 650 MILLIGRAM(S): at 11:15

## 2021-10-06 RX ADMIN — Medication 5 MILLILITER(S): at 17:03

## 2021-10-06 RX ADMIN — ALBUTEROL 2 PUFF(S): 90 AEROSOL, METERED ORAL at 15:03

## 2021-10-06 RX ADMIN — ENOXAPARIN SODIUM 40 MILLIGRAM(S): 100 INJECTION SUBCUTANEOUS at 06:49

## 2021-10-06 RX ADMIN — REMDESIVIR 500 MILLIGRAM(S): 5 INJECTION INTRAVENOUS at 22:07

## 2021-10-06 RX ADMIN — Medication 650 MILLIGRAM(S): at 11:45

## 2021-10-06 NOTE — PROGRESS NOTE ADULT - PROBLEM SELECTOR PLAN 1
Acute hypoxic respiratory failure 2/2 confirmed COVID-19 infection  - supp O2 prn to maintain O2 sats >88%. Prone PRN  - start Decadron and Remdesvir  - monitor volume status closely, avoid aggressive hydration  - tylenol prn myalgias, fever  - Avoid nebulizers. continue with metered dose inhalers prn.  - QTc: 497ms  - daily labs: CBC with diff and CMP  - ferritin, crp, d-dimer, procal at admission then will repeat If clinically worsening every 48-72 hours.  - will initiate VTE ppx: lovenox 40mg BID Acute hypoxic respiratory failure 2/2 confirmed COVID-19 infection  - supp O2 prn to maintain O2 sats >88%. Prone PRN  - start Decadron and Remdesvir  - monitor volume status closely, avoid aggressive hydration  - tylenol prn myalgias, fever; robitussin for cough  - Avoid nebulizers. continue with metered dose inhalers prn.  Pt on breo-elliopta at home, family will bring form home, carlos manuel verified by pharmacy  - QTc: 497ms  - daily labs: CBC with diff and CMP  - ferritin, crp, d-dimer, procal at admission then will repeat If clinically worsening every 48-72 hours.  - will initiate VTE ppx: lovenox 40mg BID

## 2021-10-06 NOTE — PROGRESS NOTE ADULT - PROBLEM SELECTOR PLAN 2
Patient with hx of TIA s/p R endarterectomy (2011)  - Continue home Plavix 75mg qd, Lipitor 20mg qd  - start ASA 81mg qd

## 2021-10-06 NOTE — PROGRESS NOTE ADULT - SUBJECTIVE AND OBJECTIVE BOX
[facility]  INFECTIOUS DISEASES   [address] [place] [address]  Tel: [phone]     Fax: [phone]  =======================================================    MRN-[id number]  JUSTIN DEY       Follow up: COVID 19    Feels better, on 3 L O2 with NC comfortable.  No other complaint.     PAST MEDICAL & SURGICAL HISTORY:  HLD (hyperlipidemia)  Osteoarthritis  CAD (coronary artery disease)  H/O rotator cuff surgery  R shoulder    Social Hx: no smoking, ETOH or drugs     FAMILY HISTORY:  No pertinent family history in first degree relatives      Allergies  No Known Allergies    Antibiotics:  None      REVIEW OF SYSTEMS:  CONSTITUTIONAL:  No Fever or chills  HEENT:  No diplopia or blurred vision.  No sore throat or runny nose.  CARDIOVASCULAR:  No chest pain or SOB.  RESPIRATORY:  No cough, shortness of breath, PND or orthopnea.  GASTROINTESTINAL:  No nausea, vomiting or diarrhea.  GENITOURINARY:  No dysuria, frequency or urgency. No Blood in urine  MUSCULOSKELETAL:  no joint aches, no muscle pain  SKIN:  No change in skin, hair or nails.  NEUROLOGIC:  No paresthesias, fasciculations, seizures or weakness.  PSYCHIATRIC:  No disorder of thought or mood.  ENDOCRINE:  No heat or cold intolerance, polyuria or polydipsia.  HEMATOLOGICAL:  No easy bruising or bleeding.     Physical Exam:  Vital Signs Last 24 Hrs  T(C): 36.9 (06 Oct 2021 13:29), Max: 37.7 (05 Oct 2021 19:45)  T(F): 98.4 (06 Oct 2021 13:29), Max: 99.8 (05 Oct 2021 19:45)  HR: 71 (06 Oct 2021 13:29) (70 - 80)  BP: 146/71 (06 Oct 2021 13:29) (101/59 - 146/71)  BP(mean): --  RR: 19 (06 Oct 2021 13:29) (19 - 19)  SpO2: 92% (06 Oct 2021 15:58) (87% - 95%)  GEN: NAD, obese   HEENT: normocephalic and atraumatic. EOMI. PERRL.    NECK: Supple.  No lymphadenopathy   LUNGS: scattered rhonchi   HEART: Regular rate and rhythm   ABDOMEN: Soft, nontender, and nondistended.  Positive bowel sounds.    : No CVA tenderness  EXTREMITIES: Without any cyanosis, clubbing, rash, lesions or edema.  NEUROLOGIC: grossly intact.  PSYCHIATRIC: Appropriate affect .  SKIN: No ulceration or induration present.      Labs:                        14.8   5.56  )-----------( 159      ( 06 Oct 2021 09:14 )             45.7     10-06    140  |  107  |  17  ----------------------------<  152<H>  4.0   |  26  |  0.91    Ca    8.7      06 Oct 2021 09:14  Phos  3.5     10-06  Mg     2.2     10-06    TPro  8.0  /  Alb  3.4  /  TBili  0.3  /  DBili  .10  /  AST  31  /  ALT  38  /  AlkPhos  217<H>  10-06    Culture - Urine (collected 10-04-21 @ 22:01)  Source: Clean Catch Clean Catch (Midstream)  Final Report (10-06-21 @ 12:25):    <10,000 CFU/mL Normal Urogenital Monisha    WBC Count: 5.56 K/uL (10-06-21 @ 09:14)  WBC Count: 6.49 K/uL (10-05-21 @ 13:22)  WBC Count: 6.41 K/uL (10-04-21 @ 12:44)    Creatinine, Serum: 0.91 mg/dL (10-06-21 @ 09:14)  Creatinine, Serum: 0.98 mg/dL (10-05-21 @ 17:46)  Creatinine, Serum: 0.99 mg/dL (10-05-21 @ 13:22)  Creatinine, Serum: 1.10 mg/dL (10-04-21 @ 12:44)    C-Reactive Protein, Serum: 13 mg/L (10-05-21 @ 17:31)    Ferritin, Serum: 97 ng/mL (10-05-21 @ 20:16)    Procalcitonin, Serum: <0.05 (10-05-21 @ 13:22)     COVID-19 PCR: Detected (10-05-21 @ 13:16)  COVID-19 PCR: Detected (10-04-21 @ 12:44)    All imaging and other data have been reviewed.  < from: Xray Chest 1 View- PORTABLE-Urgent (10.04.21 @ 12:41) >  EXAM:  XR CHEST PORTABLE URGENT 1V                        PROCEDURE DATE:  10/04/2021    INTERPRETATION:  Weakness.  AP chest.  Heart magnified by projection. Calcified aortic arch.  streaky fibrotic/atelectatic changes left base withmild elevation left hemidiaphragm. No consolidation or effusion.  IMPRESSION: No active infiltrates.    Assessment and Plan:   89y/o man with PMH of CAD, and OA was admitted today with vomiting and SOB for two days. he also has generalized weakness, body aches and headache. He recently came back from [place] and states  that he had contact with Known COVID case in [place]. He was seen in ED on 10/4 and had a positive COVID test.  Treatment usually is different case by case, data suggest that these might work:   Remdisivir:  5 day course , ALT < 5X ULN  Steroid: For hypoxic patients on supplemental O2 of intubated. dexamethasone 6mg PO or IV Q-day x 10 days (equivalent to solumedrol 32mg IV or Prednisone 40mg)  Anticoagulation:  with prophylactic dosing, full dose to be considered in patients with increased risk for thromboembolic complications. Bleeding can happen but acceptable in high risk patients due to hypercoagulable state.  LMWH is good, for high risk patients consider discharge on oral anticoagulation with rivaroxaban (Xarelto) 10mg PO QD or Eliquis (apixaban) 2.5-5mg PO BID  x 4 weeks.  Currently data and recommendations for COVID-19 treatment are rapidly changing, so this treatment plan is based on my clinical judgement with available information.     COVID 19   - BMP, CBC w diff, NLR. Procalcitonin, Ferritin, CRP, LDH and D dimer for the start and periodically can be repeated.   - CXR with no opacities   - Continue Remdesivir total 5days or until discharge whichever comes first.   - Continue Dexamethasone 6mg po daily for 10days  - Follow Creat and LFTs daily while on Remdesivir.    - Watch O2 sat closely and taper as tolerated.   - No antibiotics at this time.  - Anticoagulation as per protocol  - No indication for Tocilizumab and Monoclonal Ab at this time.     Will follow.     Dr. Salas Hernandez   Infectious Diseases   Please call [phone] between 8am and 6pm, call [phone] after 6pm or weekends.

## 2021-10-06 NOTE — PROGRESS NOTE ADULT - SUBJECTIVE AND OBJECTIVE BOX
SUBJECTIVE:    No acute events overnight, afebrile, hds.    VITAL SIGNS:    Vital Signs Last 24 Hrs  T(C): 36.8 (06 Oct 2021 05:26), Max: 37.7 (05 Oct 2021 19:45)  T(F): 98.3 (06 Oct 2021 05:26), Max: 99.8 (05 Oct 2021 19:45)  HR: 70 (06 Oct 2021 05:26) (70 - 82)  BP: 101/59 (06 Oct 2021 05:26) (101/59 - 138/81)  BP(mean): --  RR: 19 (06 Oct 2021 05:26) (16 - 19)  SpO2: 91% (06 Oct 2021 05:26) (91% - 95%)    PHYSICAL EXAM:     GENERAL: no acute distress  HEENT: NC/AT, EOMI, neck supple, MMM  RESPIRATORY: LCTAB/L, no rhonchi, rales, or wheezing  CARDIOVASCULAR: RRR, no murmurs, gallops, rubs  ABDOMINAL: soft, non-tender, non-distended, positive bowel sounds   EXTREMITIES: no clubbing, cyanosis, or edema  NEUROLOGICAL: alert and oriented x 3, non-focal  SKIN: no rashes or lesions   MUSCULOSKELETAL: no gross joint deformity                          14.8   5.56  )-----------( 159      ( 06 Oct 2021 09:14 )             45.7     10-06    140  |  107  |  17  ----------------------------<  152<H>  4.0   |  26  |  0.91    Ca    8.7      06 Oct 2021 09:14  Phos  3.5     10-06  Mg     2.2     10-06    TPro  8.0  /  Alb  3.4  /  TBili  0.3  /  DBili  .10  /  AST  31  /  ALT  38  /  AlkPhos  217<H>  10-06      CAPILLARY BLOOD GLUCOSE          MEDICATIONS  (STANDING):  ALBUTerol    90 MICROgram(s) HFA Inhaler 2 Puff(s) Inhalation every 6 hours  aspirin  chewable 81 milliGRAM(s) Oral daily  atorvastatin 20 milliGRAM(s) Oral at bedtime  clopidogrel Tablet 75 milliGRAM(s) Oral daily  dexAMETHasone  Injectable 6 milliGRAM(s) IV Push daily  enoxaparin Injectable 40 milliGRAM(s) SubCutaneous every 12 hours  guaifenesin/dextromethorphan Oral Liquid 5 milliLiter(s) Oral every 6 hours  remdesivir  IVPB   IV Intermittent   remdesivir  IVPB 100 milliGRAM(s) IV Intermittent every 24 hours       SUBJECTIVE:    No acute events overnight, afebrile, hds.  No acute cp, sob, having some cough.    VITAL SIGNS:    Vital Signs Last 24 Hrs  T(C): 36.8 (06 Oct 2021 05:26), Max: 37.7 (05 Oct 2021 19:45)  T(F): 98.3 (06 Oct 2021 05:26), Max: 99.8 (05 Oct 2021 19:45)  HR: 70 (06 Oct 2021 05:26) (70 - 82)  BP: 101/59 (06 Oct 2021 05:26) (101/59 - 138/81)  BP(mean): --  RR: 19 (06 Oct 2021 05:26) (16 - 19)  SpO2: 91% (06 Oct 2021 05:26) (91% - 95%)    PHYSICAL EXAM:     GENERAL: no acute distress  HEENT: EOMI, neck supple, MMM  RESPIRATORY: coarse bs b/l  CARDIOVASCULAR: RRR, no murmurs, gallops, rubs  ABDOMINAL: soft, non-tender, non-distended, positive bowel sounds   EXTREMITIES: no clubbing, cyanosis, or edema  NEUROLOGICAL: alert and oriented x 3, non-focal  SKIN: no rashes or lesions   MUSCULOSKELETAL: no gross joint deformity                          14.8   5.56  )-----------( 159      ( 06 Oct 2021 09:14 )             45.7     10-06    140  |  107  |  17  ----------------------------<  152<H>  4.0   |  26  |  0.91    Ca    8.7      06 Oct 2021 09:14  Phos  3.5     10-06  Mg     2.2     10-06    TPro  8.0  /  Alb  3.4  /  TBili  0.3  /  DBili  .10  /  AST  31  /  ALT  38  /  AlkPhos  217<H>  10-06      CAPILLARY BLOOD GLUCOSE          MEDICATIONS  (STANDING):  ALBUTerol    90 MICROgram(s) HFA Inhaler 2 Puff(s) Inhalation every 6 hours  aspirin  chewable 81 milliGRAM(s) Oral daily  atorvastatin 20 milliGRAM(s) Oral at bedtime  clopidogrel Tablet 75 milliGRAM(s) Oral daily  dexAMETHasone  Injectable 6 milliGRAM(s) IV Push daily  enoxaparin Injectable 40 milliGRAM(s) SubCutaneous every 12 hours  guaifenesin/dextromethorphan Oral Liquid 5 milliLiter(s) Oral every 6 hours  remdesivir  IVPB   IV Intermittent   remdesivir  IVPB 100 milliGRAM(s) IV Intermittent every 24 hours

## 2021-10-06 NOTE — PROGRESS NOTE ADULT - PROBLEM SELECTOR PLAN 4
DVT Prophylaxis:  - Lovenox 40mg q12. Pending d-dimer level will consider increasing to therapeutic dosing of lovenox

## 2021-10-07 LAB
ALBUMIN SERPL ELPH-MCNC: 3 G/DL — LOW (ref 3.3–5)
ALP SERPL-CCNC: 189 U/L — HIGH (ref 40–120)
ALT FLD-CCNC: 47 U/L — SIGNIFICANT CHANGE UP (ref 12–78)
ANION GAP SERPL CALC-SCNC: 6 MMOL/L — SIGNIFICANT CHANGE UP (ref 5–17)
AST SERPL-CCNC: 34 U/L — SIGNIFICANT CHANGE UP (ref 15–37)
BILIRUB DIRECT SERPL-MCNC: <.1 MG/DL — SIGNIFICANT CHANGE UP (ref 0.05–0.2)
BILIRUB INDIRECT FLD-MCNC: >0.2 MG/DL — SIGNIFICANT CHANGE UP (ref 0.2–1)
BILIRUB SERPL-MCNC: 0.3 MG/DL — SIGNIFICANT CHANGE UP (ref 0.2–1.2)
BUN SERPL-MCNC: 21 MG/DL — SIGNIFICANT CHANGE UP (ref 7–23)
CALCIUM SERPL-MCNC: 8.4 MG/DL — LOW (ref 8.5–10.1)
CHLORIDE SERPL-SCNC: 110 MMOL/L — HIGH (ref 96–108)
CO2 SERPL-SCNC: 26 MMOL/L — SIGNIFICANT CHANGE UP (ref 22–31)
CREAT SERPL-MCNC: 0.86 MG/DL — SIGNIFICANT CHANGE UP (ref 0.5–1.3)
CREAT SERPL-MCNC: 0.86 MG/DL — SIGNIFICANT CHANGE UP (ref 0.5–1.3)
GLUCOSE SERPL-MCNC: 144 MG/DL — HIGH (ref 70–99)
HCT VFR BLD CALC: 41.3 % — SIGNIFICANT CHANGE UP (ref 39–50)
HGB BLD-MCNC: 13.6 G/DL — SIGNIFICANT CHANGE UP (ref 13–17)
MCHC RBC-ENTMCNC: 30.1 PG — SIGNIFICANT CHANGE UP (ref 27–34)
MCHC RBC-ENTMCNC: 32.9 GM/DL — SIGNIFICANT CHANGE UP (ref 32–36)
MCV RBC AUTO: 91.4 FL — SIGNIFICANT CHANGE UP (ref 80–100)
NRBC # BLD: 0 /100 WBCS — SIGNIFICANT CHANGE UP (ref 0–0)
PLATELET # BLD AUTO: 156 K/UL — SIGNIFICANT CHANGE UP (ref 150–400)
POTASSIUM SERPL-MCNC: 4.2 MMOL/L — SIGNIFICANT CHANGE UP (ref 3.5–5.3)
POTASSIUM SERPL-SCNC: 4.2 MMOL/L — SIGNIFICANT CHANGE UP (ref 3.5–5.3)
PROT SERPL-MCNC: 7.2 G/DL — SIGNIFICANT CHANGE UP (ref 6–8.3)
RBC # BLD: 4.52 M/UL — SIGNIFICANT CHANGE UP (ref 4.2–5.8)
RBC # FLD: 14.1 % — SIGNIFICANT CHANGE UP (ref 10.3–14.5)
SODIUM SERPL-SCNC: 142 MMOL/L — SIGNIFICANT CHANGE UP (ref 135–145)
WBC # BLD: 7.89 K/UL — SIGNIFICANT CHANGE UP (ref 3.8–10.5)
WBC # FLD AUTO: 7.89 K/UL — SIGNIFICANT CHANGE UP (ref 3.8–10.5)

## 2021-10-07 PROCEDURE — 99232 SBSQ HOSP IP/OBS MODERATE 35: CPT

## 2021-10-07 PROCEDURE — 99233 SBSQ HOSP IP/OBS HIGH 50: CPT

## 2021-10-07 RX ADMIN — ENOXAPARIN SODIUM 40 MILLIGRAM(S): 100 INJECTION SUBCUTANEOUS at 17:20

## 2021-10-07 RX ADMIN — Medication 81 MILLIGRAM(S): at 11:59

## 2021-10-07 RX ADMIN — Medication 6 MILLIGRAM(S): at 05:10

## 2021-10-07 RX ADMIN — ALBUTEROL 2 PUFF(S): 90 AEROSOL, METERED ORAL at 00:40

## 2021-10-07 RX ADMIN — ALBUTEROL 2 PUFF(S): 90 AEROSOL, METERED ORAL at 17:20

## 2021-10-07 RX ADMIN — ATORVASTATIN CALCIUM 20 MILLIGRAM(S): 80 TABLET, FILM COATED ORAL at 21:12

## 2021-10-07 RX ADMIN — Medication 5 MILLILITER(S): at 17:20

## 2021-10-07 RX ADMIN — Medication 5 MILLILITER(S): at 05:11

## 2021-10-07 RX ADMIN — ALBUTEROL 2 PUFF(S): 90 AEROSOL, METERED ORAL at 12:01

## 2021-10-07 RX ADMIN — CLOPIDOGREL BISULFATE 75 MILLIGRAM(S): 75 TABLET, FILM COATED ORAL at 11:59

## 2021-10-07 RX ADMIN — ALBUTEROL 2 PUFF(S): 90 AEROSOL, METERED ORAL at 06:38

## 2021-10-07 RX ADMIN — Medication 5 MILLILITER(S): at 11:59

## 2021-10-07 RX ADMIN — ENOXAPARIN SODIUM 40 MILLIGRAM(S): 100 INJECTION SUBCUTANEOUS at 05:11

## 2021-10-07 RX ADMIN — REMDESIVIR 500 MILLIGRAM(S): 5 INJECTION INTRAVENOUS at 22:47

## 2021-10-07 NOTE — PROGRESS NOTE ADULT - PROBLEM SELECTOR PLAN 1
Acute hypoxic respiratory failure 2/2 confirmed COVID-19 infection  - supp O2 prn to maintain O2 sats >88%. Prone PRN  - Decadron and Remdesvir  - monitor volume status closely, avoid aggressive hydration  - tylenol prn myalgias, fever; robitussin for cough  - Avoid nebulizers. continue with metered dose inhalers prn.  Pt on breo-elliopta at home, waiting for family to bring it in, albuterol inhaler ordered  - QTc: 497ms  - daily labs: CBC with diff and CMP  - ferritin, crp, d-dimer, procal at admission then will repeat If clinically worsening every 48-72 hours.  - will initiate VTE ppx: lovenox 40mg BID

## 2021-10-07 NOTE — PROGRESS NOTE ADULT - SUBJECTIVE AND OBJECTIVE BOX
[facility]  INFECTIOUS DISEASES   [address] [place] [address]  Tel: [phone]     Fax: [phone]  =======================================================    [id number]  JUSTIN DEY       Follow up: COVID 19    Feels better sitting on cole, NAD, on 3 L O2 with NC comfortable.  No other complaint or overnight event.     PAST MEDICAL & SURGICAL HISTORY:  HLD (hyperlipidemia)  Osteoarthritis  CAD (coronary artery disease)  H/O rotator cuff surgery  R shoulder    Social Hx: no smoking, ETOH or drugs     FAMILY HISTORY:  No pertinent family history in first degree relatives      Allergies  No Known Allergies    Antibiotics:  None      REVIEW OF SYSTEMS:  CONSTITUTIONAL:  No Fever or chills  HEENT:  No diplopia or blurred vision.  No sore throat or runny nose.  CARDIOVASCULAR:  No chest pain or SOB.  RESPIRATORY:  No cough, shortness of breath, PND or orthopnea.  GASTROINTESTINAL:  No nausea, vomiting or diarrhea.  GENITOURINARY:  No dysuria, frequency or urgency. No Blood in urine  MUSCULOSKELETAL:  no joint aches, no muscle pain  SKIN:  No change in skin, hair or nails.  NEUROLOGIC:  No paresthesias, fasciculations, seizures or weakness.  PSYCHIATRIC:  No disorder of thought or mood.  ENDOCRINE:  No heat or cold intolerance, polyuria or polydipsia.  HEMATOLOGICAL:  No easy bruising or bleeding.     Physical Exam:  Vital Signs Last 24 Hrs  T(C): 36.7 (07 Oct 2021 12:20), Max: 36.7 (06 Oct 2021 19:40)  T(F): 98 (07 Oct 2021 12:20), Max: 98.1 (06 Oct 2021 19:40)  HR: 62 (07 Oct 2021 12:20) (62 - 69)  BP: 114/61 (07 Oct 2021 12:20) (114/61 - 136/78)  BP(mean): --  RR: 20 (07 Oct 2021 12:20) (18 - 20)  SpO2: 94% (07 Oct 2021 12:20) (93% - 97%)  GEN: NAD, obese   HEENT: normocephalic and atraumatic. EOMI. PERRL.    NECK: Supple.  No lymphadenopathy   LUNGS: scattered rhonchi   HEART: Regular rate and rhythm   ABDOMEN: Soft, nontender, and nondistended.  Positive bowel sounds.    : No CVA tenderness  EXTREMITIES: Without any cyanosis, clubbing, rash, lesions or edema.  NEUROLOGIC: grossly intact.  PSYCHIATRIC: Appropriate affect .  SKIN: No ulceration or induration present.    Labs:                        13.6   7.89  )-----------( 156      ( 07 Oct 2021 08:31 )             41.3     10-07    142  |  110<H>  |  21  ----------------------------<  144<H>  4.2   |  26  |  0.86    Ca    8.4<L>      07 Oct 2021 08:15  Phos  3.5     10-06  Mg     2.2     10-06    TPro  7.2  /  Alb  3.0<L>  /  TBili  0.3  /  DBili  <.10  /  AST  34  /  ALT  47  /  AlkPhos  189<H>  10-07    Culture - Blood (collected 10-05-21 @ 17:28)  Source: .Blood Blood-Peripheral    Culture - Blood (collected 10-05-21 @ 17:28)  Source: .Blood Blood-Peripheral    Culture - Urine (collected 10-04-21 @ 22:01)  Source: Clean Catch Clean Catch (Midstream)  Final Report (10-06-21 @ 12:25):    <10,000 CFU/mL Normal Urogenital Monisha    WBC Count: 7.89 K/uL (10-07-21 @ 08:31)  WBC Count: 5.56 K/uL (10-06-21 @ 09:14)  WBC Count: 6.49 K/uL (10-05-21 @ 13:22)  WBC Count: 6.41 K/uL (10-04-21 @ 12:44)    Creatinine, Serum: 0.86 mg/dL (10-07-21 @ 08:15)  Creatinine, Serum: 0.86 mg/dL (10-07-21 @ 08:15)  Creatinine, Serum: 0.91 mg/dL (10-06-21 @ 09:14)  Creatinine, Serum: 0.98 mg/dL (10-05-21 @ 17:46)  Creatinine, Serum: 0.99 mg/dL (10-05-21 @ 13:22)  Creatinine, Serum: 1.10 mg/dL (10-04-21 @ 12:44)    C-Reactive Protein, Serum: 13 mg/L (10-05-21 @ 17:31)    Ferritin, Serum: 97 ng/mL (10-05-21 @ 20:16)    Procalcitonin, Serum: <0.05 (10-05-21 @ 13:22)     COVID-19 PCR: Detected (10-05-21 @ 13:16)  COVID-19 PCR: Detected (10-04-21 @ 12:44)    All imaging and other data have been reviewed.  < from: Xray Chest 1 View- PORTABLE-Urgent (10.04.21 @ 12:41) >  EXAM:  XR CHEST PORTABLE URGENT 1V                        PROCEDURE DATE:  10/04/2021    INTERPRETATION:  Weakness.  AP chest.  Heart magnified by projection. Calcified aortic arch.  streaky fibrotic/atelectatic changes left base withmild elevation left hemidiaphragm. No consolidation or effusion.  IMPRESSION: No active infiltrates.    Assessment and Plan:   89y/o man with PMH of CAD, and OA was admitted today with vomiting and SOB for two days. he also has generalized weakness, body aches and headache. He recently came back from [place] and states  that he had contact with Known COVID case in [place]. He was seen in ED on 10/4 and had a positive COVID test.  Treatment usually is different case by case, data suggest that these might work:   Remdisivir:  5 day course , ALT < 5X ULN  Steroid: For hypoxic patients on supplemental O2 of intubated. dexamethasone 6mg PO or IV Q-day x 10 days (equivalent to solumedrol 32mg IV or Prednisone 40mg)  Anticoagulation:  with prophylactic dosing, full dose to be considered in patients with increased risk for thromboembolic complications. Bleeding can happen but acceptable in high risk patients due to hypercoagulable state.  LMWH is good, for high risk patients consider discharge on oral anticoagulation with rivaroxaban (Xarelto) 10mg PO QD or Eliquis (apixaban) 2.5-5mg PO BID  x 4 weeks.  Currently data and recommendations for COVID-19 treatment are rapidly changing, so this treatment plan is based on my clinical judgement with available information.     COVID 19   - BMP, CBC w diff, NLR. Procalcitonin, Ferritin, CRP, LDH and D dimer for the start and periodically can be repeated.   - CXR with no opacities   - Continue Remdesivir total 5days or until discharge whichever comes first. today day 3/5  - Continue Dexamethasone 6mg po daily for 10days  - Follow Creat and LFTs daily while on Remdesivir.    - Watch O2 sat closely and taper as tolerated.   - No antibiotics at this time.  - Anticoagulation as per protocol  - No indication for Tocilizumab and Monoclonal Ab at this time.     Will follow PRN.     Dr. Salas Hernandez   Infectious Diseases   Please call [phone] between 8am and 6pm, call [phone] after 6pm or weekends.

## 2021-10-07 NOTE — PROGRESS NOTE ADULT - SUBJECTIVE AND OBJECTIVE BOX
SUBJECTIVE:    No acute events overnight, afebrile, hds.  No acute cp, sob.      VITAL SIGNS:    Vital Signs Last 24 Hrs  T(C): 36.7 (07 Oct 2021 12:20), Max: 36.7 (06 Oct 2021 19:40)  T(F): 98 (07 Oct 2021 12:20), Max: 98.1 (06 Oct 2021 19:40)  HR: 62 (07 Oct 2021 12:20) (62 - 69)  BP: 114/61 (07 Oct 2021 12:20) (114/61 - 136/78)  BP(mean): --  RR: 20 (07 Oct 2021 12:20) (18 - 20)  SpO2: 94% (07 Oct 2021 12:20) (87% - 97%)    PHYSICAL EXAM:     GENERAL: no acute distress  HEENT: EOMI, neck supple, MMM  RESPIRATORY: coarse bs b/l  CARDIOVASCULAR: RRR, no murmurs, gallops, rubs  ABDOMINAL: soft, non-tender, non-distended, positive bowel sounds   EXTREMITIES: no clubbing, cyanosis, or edema  NEUROLOGICAL: alert and oriented x 3, non-focal  SKIN: no rashes or lesions   MUSCULOSKELETAL: no gross joint deformity                          13.6   7.89  )-----------( 156      ( 07 Oct 2021 08:31 )             41.3     10-07    142  |  110<H>  |  21  ----------------------------<  144<H>  4.2   |  26  |  0.86    Ca    8.4<L>      07 Oct 2021 08:15  Phos  3.5     10-06  Mg     2.2     10-06    TPro  7.2  /  Alb  3.0<L>  /  TBili  0.3  /  DBili  <.10  /  AST  34  /  ALT  47  /  AlkPhos  189<H>  10-07      CAPILLARY BLOOD GLUCOSE          MEDICATIONS  (STANDING):  ALBUTerol    90 MICROgram(s) HFA Inhaler 2 Puff(s) Inhalation every 6 hours  aspirin  chewable 81 milliGRAM(s) Oral daily  atorvastatin 20 milliGRAM(s) Oral at bedtime  clopidogrel Tablet 75 milliGRAM(s) Oral daily  dexAMETHasone  Injectable 6 milliGRAM(s) IV Push daily  enoxaparin Injectable 40 milliGRAM(s) SubCutaneous every 12 hours  guaifenesin/dextromethorphan Oral Liquid 5 milliLiter(s) Oral every 6 hours  remdesivir  IVPB   IV Intermittent   remdesivir  IVPB 100 milliGRAM(s) IV Intermittent every 24 hours

## 2021-10-08 LAB
ALBUMIN SERPL ELPH-MCNC: 3.3 G/DL — SIGNIFICANT CHANGE UP (ref 3.3–5)
ALP SERPL-CCNC: 187 U/L — HIGH (ref 40–120)
ALT FLD-CCNC: 52 U/L — SIGNIFICANT CHANGE UP (ref 12–78)
ANION GAP SERPL CALC-SCNC: 6 MMOL/L — SIGNIFICANT CHANGE UP (ref 5–17)
AST SERPL-CCNC: 33 U/L — SIGNIFICANT CHANGE UP (ref 15–37)
BILIRUB DIRECT SERPL-MCNC: 0.1 MG/DL — SIGNIFICANT CHANGE UP (ref 0.05–0.2)
BILIRUB INDIRECT FLD-MCNC: 0.2 MG/DL — SIGNIFICANT CHANGE UP (ref 0.2–1)
BILIRUB SERPL-MCNC: 0.3 MG/DL — SIGNIFICANT CHANGE UP (ref 0.2–1.2)
BUN SERPL-MCNC: 26 MG/DL — HIGH (ref 7–23)
CALCIUM SERPL-MCNC: 8.5 MG/DL — SIGNIFICANT CHANGE UP (ref 8.5–10.1)
CHLORIDE SERPL-SCNC: 106 MMOL/L — SIGNIFICANT CHANGE UP (ref 96–108)
CO2 SERPL-SCNC: 27 MMOL/L — SIGNIFICANT CHANGE UP (ref 22–31)
CREAT SERPL-MCNC: 1 MG/DL — SIGNIFICANT CHANGE UP (ref 0.5–1.3)
GLUCOSE SERPL-MCNC: 207 MG/DL — HIGH (ref 70–99)
HCT VFR BLD CALC: 43.8 % — SIGNIFICANT CHANGE UP (ref 39–50)
HGB BLD-MCNC: 14.2 G/DL — SIGNIFICANT CHANGE UP (ref 13–17)
MCHC RBC-ENTMCNC: 29.6 PG — SIGNIFICANT CHANGE UP (ref 27–34)
MCHC RBC-ENTMCNC: 32.4 GM/DL — SIGNIFICANT CHANGE UP (ref 32–36)
MCV RBC AUTO: 91.3 FL — SIGNIFICANT CHANGE UP (ref 80–100)
NRBC # BLD: 0 /100 WBCS — SIGNIFICANT CHANGE UP (ref 0–0)
PLATELET # BLD AUTO: 198 K/UL — SIGNIFICANT CHANGE UP (ref 150–400)
POTASSIUM SERPL-MCNC: 3.9 MMOL/L — SIGNIFICANT CHANGE UP (ref 3.5–5.3)
POTASSIUM SERPL-SCNC: 3.9 MMOL/L — SIGNIFICANT CHANGE UP (ref 3.5–5.3)
PROT SERPL-MCNC: 7.4 G/DL — SIGNIFICANT CHANGE UP (ref 6–8.3)
RBC # BLD: 4.8 M/UL — SIGNIFICANT CHANGE UP (ref 4.2–5.8)
RBC # FLD: 14 % — SIGNIFICANT CHANGE UP (ref 10.3–14.5)
SODIUM SERPL-SCNC: 139 MMOL/L — SIGNIFICANT CHANGE UP (ref 135–145)
WBC # BLD: 8.61 K/UL — SIGNIFICANT CHANGE UP (ref 3.8–10.5)
WBC # FLD AUTO: 8.61 K/UL — SIGNIFICANT CHANGE UP (ref 3.8–10.5)

## 2021-10-08 PROCEDURE — 99233 SBSQ HOSP IP/OBS HIGH 50: CPT

## 2021-10-08 PROCEDURE — 99232 SBSQ HOSP IP/OBS MODERATE 35: CPT

## 2021-10-08 RX ORDER — FLUTICASONE PROPIONATE 50 MCG
1 SPRAY, SUSPENSION NASAL
Refills: 0 | Status: DISCONTINUED | OUTPATIENT
Start: 2021-10-08 | End: 2021-10-19

## 2021-10-08 RX ADMIN — Medication 1 SPRAY(S): at 17:18

## 2021-10-08 RX ADMIN — Medication 5 MILLILITER(S): at 00:14

## 2021-10-08 RX ADMIN — ENOXAPARIN SODIUM 40 MILLIGRAM(S): 100 INJECTION SUBCUTANEOUS at 17:18

## 2021-10-08 RX ADMIN — ALBUTEROL 2 PUFF(S): 90 AEROSOL, METERED ORAL at 01:08

## 2021-10-08 RX ADMIN — ATORVASTATIN CALCIUM 20 MILLIGRAM(S): 80 TABLET, FILM COATED ORAL at 21:16

## 2021-10-08 RX ADMIN — Medication 5 MILLILITER(S): at 20:04

## 2021-10-08 RX ADMIN — ALBUTEROL 2 PUFF(S): 90 AEROSOL, METERED ORAL at 06:40

## 2021-10-08 RX ADMIN — ALBUTEROL 2 PUFF(S): 90 AEROSOL, METERED ORAL at 11:58

## 2021-10-08 RX ADMIN — ALBUTEROL 2 PUFF(S): 90 AEROSOL, METERED ORAL at 17:18

## 2021-10-08 RX ADMIN — Medication 81 MILLIGRAM(S): at 11:57

## 2021-10-08 RX ADMIN — CLOPIDOGREL BISULFATE 75 MILLIGRAM(S): 75 TABLET, FILM COATED ORAL at 11:57

## 2021-10-08 RX ADMIN — Medication 5 MILLILITER(S): at 11:57

## 2021-10-08 RX ADMIN — REMDESIVIR 500 MILLIGRAM(S): 5 INJECTION INTRAVENOUS at 22:39

## 2021-10-08 RX ADMIN — Medication 6 MILLIGRAM(S): at 05:59

## 2021-10-08 RX ADMIN — ENOXAPARIN SODIUM 40 MILLIGRAM(S): 100 INJECTION SUBCUTANEOUS at 05:58

## 2021-10-08 RX ADMIN — Medication 5 MILLILITER(S): at 06:00

## 2021-10-08 NOTE — PROGRESS NOTE ADULT - PROBLEM SELECTOR PLAN 1
Acute hypoxic respiratory failure 2/2 confirmed COVID-19 infection  - supp O2 prn to maintain O2 sats >88%. Prone PRN  - Decadron and Remdesvir  - monitor volume status closely, avoid aggressive hydration  - tylenol prn myalgias, fever; robitussin for cough  - Avoid nebulizers. continue with metered dose inhalers prn.  Pt on breo-elliopta at home, waiting for family to bring it in, albuterol inhaler ordered, will also add fluticasone as per pt's wishes  - QTc: 497ms  - daily labs: CBC with diff and CMP  - ferritin, crp, d-dimer, procal at admission then will repeat If clinically worsening every 48-72 hours.  - will initiate VTE ppx: lovenox 40mg BID

## 2021-10-08 NOTE — PROGRESS NOTE ADULT - SUBJECTIVE AND OBJECTIVE BOX
SUBJECTIVE:    No acute events overnight, afebrile, hds.  No acute sob, cp, cough controlled.    VITAL SIGNS:    Vital Signs Last 24 Hrs  T(C): 36.7 (08 Oct 2021 12:19), Max: 36.9 (08 Oct 2021 05:16)  T(F): 98 (08 Oct 2021 12:19), Max: 98.4 (08 Oct 2021 05:16)  HR: 69 (08 Oct 2021 12:19) (64 - 69)  BP: 116/68 (08 Oct 2021 12:19) (113/65 - 156/83)  BP(mean): --  RR: 20 (08 Oct 2021 12:19) (18 - 20)  SpO2: 94% (08 Oct 2021 12:19) (93% - 95%)    PHYSICAL EXAM:     GENERAL: no acute distress  HEENT: EOMI, neck supple, MMM  RESPIRATORY: coarse bs b/l  CARDIOVASCULAR: RRR, no murmurs, gallops, rubs  ABDOMINAL: soft, non-tender, non-distended, positive bowel sounds   EXTREMITIES: no clubbing, cyanosis, or edema  NEUROLOGICAL: alert and oriented x 3, non-focal  SKIN: no rashes or lesions   MUSCULOSKELETAL: no gross joint deformity                            14.2   8.61  )-----------( 198      ( 08 Oct 2021 09:43 )             43.8     10-08    139  |  106  |  26<H>  ----------------------------<  207<H>  3.9   |  27  |  1.00    Ca    8.5      08 Oct 2021 09:43    TPro  7.4  /  Alb  3.3  /  TBili  0.3  /  DBili  .10  /  AST  33  /  ALT  52  /  AlkPhos  187<H>  10-08      CAPILLARY BLOOD GLUCOSE          MEDICATIONS  (STANDING):  ALBUTerol    90 MICROgram(s) HFA Inhaler 2 Puff(s) Inhalation every 6 hours  aspirin  chewable 81 milliGRAM(s) Oral daily  atorvastatin 20 milliGRAM(s) Oral at bedtime  clopidogrel Tablet 75 milliGRAM(s) Oral daily  dexAMETHasone  Injectable 6 milliGRAM(s) IV Push daily  enoxaparin Injectable 40 milliGRAM(s) SubCutaneous every 12 hours  guaifenesin/dextromethorphan Oral Liquid 5 milliLiter(s) Oral every 6 hours  remdesivir  IVPB   IV Intermittent   remdesivir  IVPB 100 milliGRAM(s) IV Intermittent every 24 hours

## 2021-10-08 NOTE — PROGRESS NOTE ADULT - SUBJECTIVE AND OBJECTIVE BOX
[facility]  INFECTIOUS DISEASES   [address] [place] [address]  Tel: [phone]     Fax: [phone]  =======================================================    [id number]  JUSTIN DEY       Follow up: COVID 19    Feels better sitting on cole, NAD, on 3 L O2 with NC comfortable.  No other complaint or overnight event.     PAST MEDICAL & SURGICAL HISTORY:  HLD (hyperlipidemia)  Osteoarthritis  CAD (coronary artery disease)  H/O rotator cuff surgery  R shoulder    Social Hx: no smoking, ETOH or drugs     FAMILY HISTORY:  No pertinent family history in first degree relatives      Allergies  No Known Allergies    Antibiotics:  None      REVIEW OF SYSTEMS:  CONSTITUTIONAL:  No Fever or chills  HEENT:  No diplopia or blurred vision.  No sore throat or runny nose.  CARDIOVASCULAR:  No chest pain or SOB.  RESPIRATORY:  No cough, shortness of breath, PND or orthopnea.  GASTROINTESTINAL:  No nausea, vomiting or diarrhea.  GENITOURINARY:  No dysuria, frequency or urgency. No Blood in urine  MUSCULOSKELETAL:  no joint aches, no muscle pain  SKIN:  No change in skin, hair or nails.  NEUROLOGIC:  No paresthesias, fasciculations, seizures or weakness.  PSYCHIATRIC:  No disorder of thought or mood.  ENDOCRINE:  No heat or cold intolerance, polyuria or polydipsia.  HEMATOLOGICAL:  No easy bruising or bleeding.     Physical Exam:  Vital Signs Last 24 Hrs  T(C): 36.7 (07 Oct 2021 12:20), Max: 36.7 (06 Oct 2021 19:40)  T(F): 98 (07 Oct 2021 12:20), Max: 98.1 (06 Oct 2021 19:40)  HR: 62 (07 Oct 2021 12:20) (62 - 69)  BP: 114/61 (07 Oct 2021 12:20) (114/61 - 136/78)  BP(mean): --  RR: 20 (07 Oct 2021 12:20) (18 - 20)  SpO2: 94% (07 Oct 2021 12:20) (93% - 97%)  GEN: NAD, obese   HEENT: normocephalic and atraumatic. EOMI. PERRL.    NECK: Supple.  No lymphadenopathy   LUNGS: scattered rhonchi   HEART: Regular rate and rhythm   ABDOMEN: Soft, nontender, and nondistended.  Positive bowel sounds.    : No CVA tenderness  EXTREMITIES: Without any cyanosis, clubbing, rash, lesions or edema.  NEUROLOGIC: grossly intact.  PSYCHIATRIC: Appropriate affect .  SKIN: No ulceration or induration present.    Labs:                        13.6   7.89  )-----------( 156      ( 07 Oct 2021 08:31 )             41.3     10-07    142  |  110<H>  |  21  ----------------------------<  144<H>  4.2   |  26  |  0.86    Ca    8.4<L>      07 Oct 2021 08:15  Phos  3.5     10-06  Mg     2.2     10-06    TPro  7.2  /  Alb  3.0<L>  /  TBili  0.3  /  DBili  <.10  /  AST  34  /  ALT  47  /  AlkPhos  189<H>  10-07    Culture - Blood (collected 10-05-21 @ 17:28)  Source: .Blood Blood-Peripheral    Culture - Blood (collected 10-05-21 @ 17:28)  Source: .Blood Blood-Peripheral    Culture - Urine (collected 10-04-21 @ 22:01)  Source: Clean Catch Clean Catch (Midstream)  Final Report (10-06-21 @ 12:25):    <10,000 CFU/mL Normal Urogenital Monisha    WBC Count: 7.89 K/uL (10-07-21 @ 08:31)  WBC Count: 5.56 K/uL (10-06-21 @ 09:14)  WBC Count: 6.49 K/uL (10-05-21 @ 13:22)  WBC Count: 6.41 K/uL (10-04-21 @ 12:44)    Creatinine, Serum: 0.86 mg/dL (10-07-21 @ 08:15)  Creatinine, Serum: 0.86 mg/dL (10-07-21 @ 08:15)  Creatinine, Serum: 0.91 mg/dL (10-06-21 @ 09:14)  Creatinine, Serum: 0.98 mg/dL (10-05-21 @ 17:46)  Creatinine, Serum: 0.99 mg/dL (10-05-21 @ 13:22)  Creatinine, Serum: 1.10 mg/dL (10-04-21 @ 12:44)    C-Reactive Protein, Serum: 13 mg/L (10-05-21 @ 17:31)    Ferritin, Serum: 97 ng/mL (10-05-21 @ 20:16)    Procalcitonin, Serum: <0.05 (10-05-21 @ 13:22)     COVID-19 PCR: Detected (10-05-21 @ 13:16)  COVID-19 PCR: Detected (10-04-21 @ 12:44)    All imaging and other data have been reviewed.  < from: Xray Chest 1 View- PORTABLE-Urgent (10.04.21 @ 12:41) >  EXAM:  XR CHEST PORTABLE URGENT 1V                        PROCEDURE DATE:  10/04/2021    INTERPRETATION:  Weakness.  AP chest.  Heart magnified by projection. Calcified aortic arch.  streaky fibrotic/atelectatic changes left base withmild elevation left hemidiaphragm. No consolidation or effusion.  IMPRESSION: No active infiltrates.    Assessment and Plan:   89y/o man with PMH of CAD, and OA was admitted today with vomiting and SOB for two days. he also has generalized weakness, body aches and headache. He recently came back from [place] and states  that he had contact with Known COVID case in [place]. He was seen in ED on 10/4 and had a positive COVID test.  Treatment usually is different case by case, data suggest that these might work:   Remdisivir:  5 day course , ALT < 5X ULN  Steroid: For hypoxic patients on supplemental O2 of intubated. dexamethasone 6mg PO or IV Q-day x 10 days (equivalent to solumedrol 32mg IV or Prednisone 40mg)  Anticoagulation:  with prophylactic dosing, full dose to be considered in patients with increased risk for thromboembolic complications. Bleeding can happen but acceptable in high risk patients due to hypercoagulable state.  LMWH is good, for high risk patients consider discharge on oral anticoagulation with rivaroxaban (Xarelto) 10mg PO QD or Eliquis (apixaban) 2.5-5mg PO BID  x 4 weeks.  Currently data and recommendations for COVID-19 treatment are rapidly changing, so this treatment plan is based on my clinical judgement with available information.     COVID 19   - BMP, CBC w diff, NLR. Procalcitonin, Ferritin, CRP, LDH and D dimer for the start and periodically can be repeated.   - CXR with no opacities   - Continue Remdesivir total 5days or until discharge whichever comes first. Tomorrow is the last day.   - Continue Dexamethasone 6mg po daily for 10days  - Follow Creat and LFTs daily while on Remdesivir.    - Watch O2 sat closely and taper as tolerated.   - No antibiotics at this time.  - Anticoagulation as per protocol  - No indication for Tocilizumab and Monoclonal Ab at this time.     Will follow PRN. Please call with any question.     Dr. Salas Hernandez   Infectious Diseases   Please call [phone] between 8am and 6pm, call [phone] after 6pm or weekends.

## 2021-10-09 PROCEDURE — 99233 SBSQ HOSP IP/OBS HIGH 50: CPT

## 2021-10-09 PROCEDURE — 99232 SBSQ HOSP IP/OBS MODERATE 35: CPT

## 2021-10-09 RX ORDER — FLUTICASONE FUROATE AND VILANTEROL TRIFENATATE 100; 25 UG/1; UG/1
1 POWDER RESPIRATORY (INHALATION) DAILY
Refills: 0 | Status: DISCONTINUED | OUTPATIENT
Start: 2021-10-09 | End: 2021-10-19

## 2021-10-09 RX ORDER — HALOPERIDOL DECANOATE 100 MG/ML
5 INJECTION INTRAMUSCULAR ONCE
Refills: 0 | Status: COMPLETED | OUTPATIENT
Start: 2021-10-09 | End: 2021-10-09

## 2021-10-09 RX ORDER — OLANZAPINE 15 MG/1
2.5 TABLET, FILM COATED ORAL ONCE
Refills: 0 | Status: COMPLETED | OUTPATIENT
Start: 2021-10-09 | End: 2021-10-09

## 2021-10-09 RX ORDER — OLANZAPINE 15 MG/1
5 TABLET, FILM COATED ORAL ONCE
Refills: 0 | Status: DISCONTINUED | OUTPATIENT
Start: 2021-10-09 | End: 2021-10-19

## 2021-10-09 RX ADMIN — ALBUTEROL 2 PUFF(S): 90 AEROSOL, METERED ORAL at 03:00

## 2021-10-09 RX ADMIN — HALOPERIDOL DECANOATE 5 MILLIGRAM(S): 100 INJECTION INTRAMUSCULAR at 01:02

## 2021-10-09 RX ADMIN — HALOPERIDOL DECANOATE 5 MILLIGRAM(S): 100 INJECTION INTRAMUSCULAR at 00:48

## 2021-10-09 RX ADMIN — ENOXAPARIN SODIUM 40 MILLIGRAM(S): 100 INJECTION SUBCUTANEOUS at 17:09

## 2021-10-09 RX ADMIN — FLUTICASONE FUROATE AND VILANTEROL TRIFENATATE 1 PUFF(S): 100; 25 POWDER RESPIRATORY (INHALATION) at 17:10

## 2021-10-09 RX ADMIN — Medication 1 SPRAY(S): at 17:10

## 2021-10-09 RX ADMIN — HALOPERIDOL DECANOATE 5 MILLIGRAM(S): 100 INJECTION INTRAMUSCULAR at 04:56

## 2021-10-09 RX ADMIN — ALBUTEROL 2 PUFF(S): 90 AEROSOL, METERED ORAL at 13:02

## 2021-10-09 RX ADMIN — OLANZAPINE 2.5 MILLIGRAM(S): 15 TABLET, FILM COATED ORAL at 00:39

## 2021-10-09 RX ADMIN — ENOXAPARIN SODIUM 40 MILLIGRAM(S): 100 INJECTION SUBCUTANEOUS at 08:39

## 2021-10-09 RX ADMIN — REMDESIVIR 500 MILLIGRAM(S): 5 INJECTION INTRAVENOUS at 22:41

## 2021-10-09 RX ADMIN — Medication 2 MILLIGRAM(S): at 01:23

## 2021-10-09 RX ADMIN — ALBUTEROL 2 PUFF(S): 90 AEROSOL, METERED ORAL at 19:40

## 2021-10-09 RX ADMIN — ALBUTEROL 2 PUFF(S): 90 AEROSOL, METERED ORAL at 08:39

## 2021-10-09 RX ADMIN — HALOPERIDOL DECANOATE 5 MILLIGRAM(S): 100 INJECTION INTRAMUSCULAR at 01:23

## 2021-10-09 RX ADMIN — Medication 1 SPRAY(S): at 08:39

## 2021-10-09 NOTE — PROGRESS NOTE ADULT - PROBLEM SELECTOR PLAN 1
Acute hypoxic respiratory failure 2/2 confirmed COVID-19 infection  - supp O2 prn to maintain O2 sats >88%. Prone PRN; pt changed from O2 NC yesterday to venti mask ovenright  - Decadron and Remdesvir  - monitor volume status closely, avoid aggressive hydration  - tylenol prn myalgias, fever; robitussin for cough  - Avoid nebulizers. continue with metered dose inhalers prn.  Albuterol inhaler and fluticasone.  - QTc: 497ms  - daily labs: CBC with diff and CMP  - ferritin, crp, d-dimer, procal at admission then will repeat If clinically worsening every 48-72 hours.  - will initiate VTE ppx: lovenox 40mg BID Acute hypoxic respiratory failure 2/2 confirmed COVID-19 infection  - supp O2 prn to maintain O2 sats >88%. Prone PRN; pt changed from O2 NC yesterday to venti mask ovenright  - Decadron and Remdesvir  - monitor volume status closely, avoid aggressive hydration  - tylenol prn myalgias, fever; robitussin for cough  - Avoid nebulizers. continue with metered dose inhalers prn.  Pt's family brought in home breo-ellipta, will verify with pharmacy and start inhaler  - QTc: 497ms  - daily labs: CBC with diff and CMP  - ferritin, crp, d-dimer, procal at admission then will repeat If clinically worsening every 48-72 hours.  - will initiate VTE ppx: lovenox 40mg BID

## 2021-10-09 NOTE — PROGRESS NOTE ADULT - SUBJECTIVE AND OBJECTIVE BOX
SUBJECTIVE:    Pt was agitated ovenright, 1:1 observation started.  Also became more hypoxic, and venti mask started.    VITAL SIGNS:    Vital Signs Last 24 Hrs  T(C): 37.1 (09 Oct 2021 13:27), Max: 37.1 (09 Oct 2021 13:27)  T(F): 98.7 (09 Oct 2021 13:27), Max: 98.7 (09 Oct 2021 13:27)  HR: 93 (09 Oct 2021 13:27) (65 - 93)  BP: 150/51 (09 Oct 2021 13:27) (150/51 - 150/75)  BP(mean): --  RR: 17 (09 Oct 2021 13:27) (17 - 18)  SpO2: 95% (09 Oct 2021 13:27) (91% - 95%)    PHYSICAL EXAM:     GENERAL: no acute distress  HEENT: EOMI, neck supple, MMM  RESPIRATORY: coarse bs b/l  CARDIOVASCULAR: RRR, no murmurs, gallops, rubs  ABDOMINAL: soft, non-tender, non-distended, positive bowel sounds   EXTREMITIES: no clubbing, cyanosis, or edema  NEUROLOGICAL: alert and oriented x 3, non-focal  SKIN: no rashes or lesions   MUSCULOSKELETAL: no gross joint deformity                            14.2   8.61  )-----------( 198      ( 08 Oct 2021 09:43 )             43.8     10-08    139  |  106  |  26<H>  ----------------------------<  207<H>  3.9   |  27  |  1.00    Ca    8.5      08 Oct 2021 09:43    TPro  7.4  /  Alb  3.3  /  TBili  0.3  /  DBili  .10  /  AST  33  /  ALT  52  /  AlkPhos  187<H>  10-08      CAPILLARY BLOOD GLUCOSE          MEDICATIONS  (STANDING):  ALBUTerol    90 MICROgram(s) HFA Inhaler 2 Puff(s) Inhalation every 6 hours  aspirin  chewable 81 milliGRAM(s) Oral daily  atorvastatin 20 milliGRAM(s) Oral at bedtime  clopidogrel Tablet 75 milliGRAM(s) Oral daily  dexAMETHasone  Injectable 6 milliGRAM(s) IV Push daily  enoxaparin Injectable 40 milliGRAM(s) SubCutaneous every 12 hours  fluticasone propionate 50 MICROgram(s)/spray Nasal Spray 1 Spray(s) Both Nostrils two times a day  guaifenesin/dextromethorphan Oral Liquid 5 milliLiter(s) Oral every 6 hours  OLANZapine Injectable 5 milliGRAM(s) IntraMuscular once  remdesivir  IVPB   IV Intermittent   remdesivir  IVPB 100 milliGRAM(s) IV Intermittent every 24 hours

## 2021-10-09 NOTE — CHART NOTE - NSCHARTNOTEFT_GEN_A_CORE
Code Morales    Pt seen and examined at bedside. Pt combative, kicking and yelling "call the police." Pt took off monitor and pulled out IV. Satting 95% on NC. Pt not able to be redirected.     T(F): 98.4 (10-08-21 @ 19:58), Max: 98.4 (10-08-21 @ 19:58)  HR: 65 (10-08-21 @ 19:58) (65 - 65)  BP: 150/75 (10-08-21 @ 19:58) (150/75 - 150/75)  RR: 18 (10-08-21 @ 19:58) (18 - 18)  SpO2: 91% (10-08-21 @ 19:58) (91% - 91%)    Physical Exam:  Gen: agitated, combative  HEENT: NC/AT  Cardio: +S1, +S2, RRR  Lungs: CTA B/L, No w/r/r    A/P:   1) Severe aggression/agitation  - Given Zyprexa 2.5 mg IM x1, Haldol 5 mg IM x3, Ativan 2 mg IM x1  - Will continue to follow A Code Grey was initiated due to aggression    During the code, pt seen and examined at bedside. Pt combative, kicking and yelling "call the police." Pt took off monitor and pulled out IV. Satting 95% on NC. Pt not able to be redirected. Given Zyprexa 2.5 mg IM x1. However, security required to put patient in bed. Given Haldol 5 mg IM x1. Patient continues to be aggressive, trying to jump out of bed.     T(F): 98.4 (10-08-21 @ 19:58), Max: 98.4 (10-08-21 @ 19:58)  HR: 65 (10-08-21 @ 19:58) (65 - 65)  BP: 150/75 (10-08-21 @ 19:58) (150/75 - 150/75)  RR: 18 (10-08-21 @ 19:58) (18 - 18)  SpO2: 91% (10-08-21 @ 19:58) (91% - 91%)    Physical Exam:  Gen: agitated, combative  HEENT: NC/AT  Cardio: +S1, +S2, RRR  Lungs: CTA B/L, No w/r/r    A/P:   1) Severe aggression/agitation 2/2 hospital acquired delirium   - Given Zyprexa 2.5 mg IM x1, Haldol 5 mg IM x3, Ativan 2 mg IM x1  - Will continue to follow A Code Grey was initiated due to aggression    During the code, pt sitting in chair. Pt combative, kicking and yelling "call the police." Satting 95% on NC. Given Zyprexa 2.5 mg IM x1. Patient stands up and attempts to walk out of room, however security assists in putting patient in bed. Given Haldol 5 mg IM x1. Patient continues to be aggressive, trying to jump out of bed. Given Haldol 5 mg IM x1. Patient continues to yell "call the police, help me!" Pt took off monitor and pulled out IV. He continues to swing his arms. Pt took off monitor and pulled out IV. Given Haldol 5 mg IM x1 and Ativan 2 mg IM x1.     T(F): 98.4 (10-08-21 @ 19:58), Max: 98.4 (10-08-21 @ 19:58)  HR: 65 (10-08-21 @ 19:58) (65 - 65)  BP: 150/75 (10-08-21 @ 19:58) (150/75 - 150/75)  RR: 18 (10-08-21 @ 19:58) (18 - 18)  SpO2: 91% (10-08-21 @ 19:58) (91% - 91%)    Physical Exam:  Gen: agitated, combative  HEENT: NC/AT  Cardio: +S1, +S2, RRR  Lungs: CTA B/L, No w/r/r    A/P: Patient is a 89 y/o M with PMH of TIA s/p R endarterectomy (2011), hyperlipidemia, hiatal hernia, GERD admitted for COVID-19.    1) Severe aggression/agitation 2/2 hospital acquired delirium   - Given Zyprexa 2.5 mg IM x1, Haldol 5 mg IM x3, Ativan 2 mg IM x1  - Will continue to follow A Code Grey was initiated due to aggression    During the code, pt sitting in chair. Pt combative, kicking and yelling "call the police." Satting 95% on NC. Patient unable to be redirected by staff. Given Zyprexa 2.5 mg IM x1. Patient stands up from chair and attempts to walk out of room, however security assists in putting patient in bed. Called next of kin, Jose, who is unable to redirect patient on the phone. Given Haldol 5 mg IM x1. Patient continues to be aggressive, swinging his arms, kicking, and trying to jump out of bed. Given Haldol 5 mg IM x1. Patient continues to yell "call the police, help me!" Pt took off monitor and pulled out IV. He continues to be combative, yelling "help" and trying to get out of bed. Given Haldol 5 mg IM x1 and Ativan 2 mg IM x1. Pt kicking while RN attempts to give medication.     T(F): 98.4 (10-08-21 @ 19:58), Max: 98.4 (10-08-21 @ 19:58)  HR: 65 (10-08-21 @ 19:58) (65 - 65)  BP: 150/75 (10-08-21 @ 19:58) (150/75 - 150/75)  RR: 18 (10-08-21 @ 19:58) (18 - 18)  SpO2: 91% (10-08-21 @ 19:58) (91% - 91%)    Physical Exam:  Gen: agitated, combative  HEENT: NC/AT  Neuro: confused    A/P: Patient is a 89 y/o M with PMH of TIA s/p R endarterectomy (2011), hyperlipidemia, hiatal hernia, GERD admitted for COVID-19.    1) Severe aggression/agitation 2/2 hospital acquired delirium   - Given Zyprexa 2.5 mg IM x1, Haldol 5 mg IM x3, Ativan 2 mg IM x1  - Will continue to follow A Code Grey was initiated due to aggression    During the code, pt initially sitting in chair. Pt undressed, combative, kicking and yelling "call the police." Satting 95% on NC. Patient unable to be redirected by staff. Given Zyprexa 2.5 mg IM x1. Patient stands up from chair and attempts to walk out of room, however security assists in putting patient in bed. Called next of kin, Jose, who is unable to redirect patient on the phone. Given Haldol 5 mg IM x1. Patient continues to be aggressive, swinging his arms, kicking, and trying to jump out of bed. Given Haldol 5 mg IM x1. Patient continues to yell "call the police, help me!" Pt took off cardiac monitor and pulled out IV. He continues to be combative, yelling "help" and trying to get out of bed. Given Haldol 5 mg IM x1 and Ativan 2 mg IM x1. Pt kicking while RN attempts to give medication.     T(F): 98.4 (10-08-21 @ 19:58), Max: 98.4 (10-08-21 @ 19:58)  HR: 65 (10-08-21 @ 19:58) (65 - 65)  BP: 150/75 (10-08-21 @ 19:58) (150/75 - 150/75)  RR: 18 (10-08-21 @ 19:58) (18 - 18)  SpO2: 91% (10-08-21 @ 19:58) (91% - 91%)    Physical Exam:  Gen: agitated, combative  HEENT: NC/AT  Neuro: confused    A/P: Patient is a 89 y/o M with PMH of TIA s/p R endarterectomy (2011), hyperlipidemia, hiatal hernia, GERD admitted for COVID-19.    1) Severe aggression/agitation 2/2 hospital acquired delirium   - Given Zyprexa 2.5 mg IM x1, Haldol 5 mg IM x3, Ativan 2 mg IM x1  - Will continue to follow A Code Grey was initiated due to aggression    During the code, pt initially sitting in chair. Pt undressed, combative, kicking and yelling "call the police." Satting 95% on NC. Patient unable to be redirected by staff. Given Zyprexa 2.5 mg IM x1. Patient stands up from chair and attempts to walk out of room, however security assists in putting patient in bed. Called next of kin, Jose, who is unable to redirect patient on the phone. Given Haldol 5 mg IM x1. Patient continues to be aggressive, swinging his arms, kicking, and trying to jump out of bed. Given Haldol 5 mg IM x1. Patient continues to yell "call the police, help me!" Pt took off cardiac monitor and pulled out IV. He continues to be combative, yelling "help" and trying to get out of bed. Given Haldol 5 mg IM x1 and Ativan 2 mg IM x1. Pt kicking while RN attempts to give medication.     T(F): 98.4 (10-08-21 @ 19:58), Max: 98.4 (10-08-21 @ 19:58)  HR: 65 (10-08-21 @ 19:58) (65 - 65)  BP: 150/75 (10-08-21 @ 19:58) (150/75 - 150/75)  RR: 18 (10-08-21 @ 19:58) (18 - 18)  SpO2: 91% (10-08-21 @ 19:58) (91% - 91%)    Physical Exam:  Gen: agitated, combative  HEENT: NC/AT  Neuro: confused    A/P: Patient is a 89 y/o M with PMH of TIA s/p R endarterectomy (2011), hyperlipidemia, hiatal hernia, GERD admitted for COVID-19.    1) Severe aggression/agitation 2/2 hospital acquired delirium   - Given Zyprexa 2.5 mg IM x1, Haldol 5 mg IM x3, Ativan 2 mg IM x1  - Will continue to follow    ADDENDUM (5:18AM)  -Another Code Grey was initiated due to aggression at 4:34AM  -Pt was combative, kicking, and again yelling for the police, taking off his NC and throwing it at the staff   -Pt put on venti mask as he kept breaking the NC   -Given Zyprexa 5mg IM x1 but pt continued to remain agitated and was kicking his legs, swinging his arms and trying to take the venti mask off   -Given Haldol 5mg IM x1 with some improvement in temperament   -RN to call if any changes A Code Grey was initiated due to aggression    During the code, pt initially sitting in chair. Pt undressed, combative, kicking and yelling "call the police." Satting 95% on NC. Patient unable to be redirected by staff. Given Zyprexa 2.5 mg IM x1. Patient stands up from chair and attempts to walk out of room, however security assists in putting patient in bed. Called next of kin, Jose, who is unable to redirect patient on the phone. Given Haldol 5 mg IM x1. Patient continues to be aggressive, swinging his arms, kicking, and trying to jump out of bed. Given Haldol 5 mg IM x1. Patient continues to yell "call the police, help me!" Pt took off cardiac monitor and pulled out IV. He continues to be combative, yelling "help" and trying to get out of bed. Given Haldol 5 mg IM x1 and Ativan 2 mg IM x1. Pt kicking while RN attempts to give medication.     T(F): 98.4 (10-08-21 @ 19:58), Max: 98.4 (10-08-21 @ 19:58)  HR: 65 (10-08-21 @ 19:58) (65 - 65)  BP: 150/75 (10-08-21 @ 19:58) (150/75 - 150/75)  RR: 18 (10-08-21 @ 19:58) (18 - 18)  SpO2: 91% (10-08-21 @ 19:58) (91% - 91%)    Physical Exam:  Gen: agitated, combative  HEENT: NC/AT  Neuro: confused    A/P: Patient is a 89 y/o M with PMH of TIA s/p R endarterectomy (2011), hyperlipidemia, hiatal hernia, GERD admitted for COVID-19.    1) Severe aggression/agitation 2/2 hospital acquired delirium   - Given Zyprexa 2.5 mg IM x1, Haldol 5 mg IM x3, Ativan 2 mg IM x1  - Will continue to follow    ADDENDUM (5:18AM)  -Another Code Grey was initiated due to aggression at 4:34AM  -Pt was combative, kicking, and again yelling for the police, taking off his NC and throwing it at the staff   -Pt put on venti mask as he kept breaking the NC   -Given Zyprexa 5mg IM x1 but pt continued to remain agitated and was kicking his legs, swinging his arms and trying to take the venti mask off   -Given Haldol 5mg IM x1 with some improvement in temperament   -Ordered bilateral Level 1 wrist restraints as patient was endangering himself and the staff even after supervision and medication. Need for restraints will be re-assessed every 24 hours   -RN to call if any changes

## 2021-10-09 NOTE — PROGRESS NOTE ADULT - SUBJECTIVE AND OBJECTIVE BOX
[facility]  INFECTIOUS DISEASES   [address] [place] [address]  Tel: [phone]     Fax: [phone]  =======================================================    [id number]  JUSTIN DEY       Follow up: COVID 19    Has altered mental status, O2 requirement is hgiher today 15L venti mask.      PAST MEDICAL & SURGICAL HISTORY:  HLD (hyperlipidemia)  Osteoarthritis  CAD (coronary artery disease)  H/O rotator cuff surgery  R shoulder    Social Hx: no smoking, ETOH or drugs     FAMILY HISTORY:  No pertinent family history in first degree relatives      Allergies  No Known Allergies    Antibiotics:  None      REVIEW OF SYSTEMS:  Not answering     Physical Exam:  Vital Signs Last 24 Hrs  T(C): 37.1 (09 Oct 2021 13:27), Max: 37.1 (09 Oct 2021 13:27)  T(F): 98.7 (09 Oct 2021 13:27), Max: 98.7 (09 Oct 2021 13:27)  HR: 93 (09 Oct 2021 13:27) (65 - 93)  BP: 150/51 (09 Oct 2021 13:27) (150/51 - 150/75)  BP(mean): --  RR: 17 (09 Oct 2021 13:27) (17 - 18)  SpO2: 95% (09 Oct 2021 13:27) (91% - 95%)  GEN: NAD, obese   HEENT: normocephalic and atraumatic. EOMI. PERRL.    NECK: Supple.  No lymphadenopathy   LUNGS: scattered rhonchi   HEART: Regular rate and rhythm   ABDOMEN: Soft, nontender, and nondistended.  Positive bowel sounds.    : No CVA tenderness  EXTREMITIES: Without any cyanosis, clubbing, rash, lesions or edema.  NEUROLOGIC: grossly intact.  PSYCHIATRIC: confused   SKIN: No ulceration or induration present.    Labs:                        14.2   8.61  )-----------( 198      ( 08 Oct 2021 09:43 )             43.8     10-08    139  |  106  |  26<H>  ----------------------------<  207<H>  3.9   |  27  |  1.00    Ca    8.5      08 Oct 2021 09:43    TPro  7.4  /  Alb  3.3  /  TBili  0.3  /  DBili  .10  /  AST  33  /  ALT  52  /  AlkPhos  187<H>  10-08    Culture - Blood (collected 10-05-21 @ 17:28)  Source: .Blood Blood-Peripheral    Culture - Blood (collected 10-05-21 @ 17:28)  Source: .Blood Blood-Peripheral    Culture - Urine (collected 10-04-21 @ 22:01)  Source: Clean Catch Clean Catch (Midstream)  Final Report (10-06-21 @ 12:25):    <10,000 CFU/mL Normal Urogenital Monisha    WBC Count: 8.61 K/uL (10-08-21 @ 09:43)  WBC Count: 7.89 K/uL (10-07-21 @ 08:31)  WBC Count: 5.56 K/uL (10-06-21 @ 09:14)  WBC Count: 6.49 K/uL (10-05-21 @ 13:22)    Creatinine, Serum: 1.00 mg/dL (10-08-21 @ 09:43)  Creatinine, Serum: 0.86 mg/dL (10-07-21 @ 08:15)  Creatinine, Serum: 0.86 mg/dL (10-07-21 @ 08:15)  Creatinine, Serum: 0.91 mg/dL (10-06-21 @ 09:14)  Creatinine, Serum: 0.98 mg/dL (10-05-21 @ 17:46)  Creatinine, Serum: 0.99 mg/dL (10-05-21 @ 13:22)    C-Reactive Protein, Serum: 13 mg/L (10-05-21 @ 17:31)    Ferritin, Serum: 97 ng/mL (10-05-21 @ 20:16)    Procalcitonin, Serum: <0.05 (10-05-21 @ 13:22)     COVID-19 PCR: Detected (10-05-21 @ 13:16)  COVID-19 PCR: Detected (10-04-21 @ 12:44)    All imaging and other data have been reviewed.  < from: Xray Chest 1 View- PORTABLE-Urgent (10.04.21 @ 12:41) >  EXAM:  XR CHEST PORTABLE URGENT 1V                        PROCEDURE DATE:  10/04/2021    INTERPRETATION:  Weakness.  AP chest.  Heart magnified by projection. Calcified aortic arch.  streaky fibrotic/atelectatic changes left base withmild elevation left hemidiaphragm. No consolidation or effusion.  IMPRESSION: No active infiltrates.    Assessment and Plan:   89y/o man with PMH of CAD, and OA was admitted today with vomiting and SOB for two days. he also has generalized weakness, body aches and headache. He recently came back from [place] and states  that he had contact with Known COVID case in [place]. He was seen in ED on 10/4 and had a positive COVID test.  10/09: Mental status worsening, now has 1:1, O2 requirement is higher today, on Venti mask 15L.     COVID 19   - BMP, CBC w diff, NLR. Procalcitonin, Ferritin, CRP, LDH and D dimer for the start and periodically can be repeated.   - CXR with no opacities   - Continue Remdesivir total 5days or until discharge whichever comes first. Today is the last day.   - Continue Dexamethasone 6mg po daily for 10days  - Follow Creat and LFTs daily while on Remdesivir.    - Watch O2 sat closely and taper as tolerated.   - No antibiotics at this time.  - Anticoagulation as per protocol  - Can given one dose Tocilizumab if O2 requirement goes up and advances to HFNC     Will follow PRN. Please call with any question.     Dr. Salas Hernandez   Infectious Diseases   Please call [phone] between 8am and 6pm, call [phone] after 6pm or weekends.

## 2021-10-10 LAB
ALBUMIN SERPL ELPH-MCNC: 3.3 G/DL — SIGNIFICANT CHANGE UP (ref 3.3–5)
ALP SERPL-CCNC: 191 U/L — HIGH (ref 40–120)
ALT FLD-CCNC: 49 U/L — SIGNIFICANT CHANGE UP (ref 12–78)
ANION GAP SERPL CALC-SCNC: 8 MMOL/L — SIGNIFICANT CHANGE UP (ref 5–17)
AST SERPL-CCNC: 55 U/L — HIGH (ref 15–37)
BILIRUB DIRECT SERPL-MCNC: 0.2 MG/DL — SIGNIFICANT CHANGE UP (ref 0.05–0.2)
BILIRUB INDIRECT FLD-MCNC: 0.3 MG/DL — SIGNIFICANT CHANGE UP (ref 0.2–1)
BILIRUB SERPL-MCNC: 0.5 MG/DL — SIGNIFICANT CHANGE UP (ref 0.2–1.2)
BUN SERPL-MCNC: 23 MG/DL — SIGNIFICANT CHANGE UP (ref 7–23)
CALCIUM SERPL-MCNC: 8.8 MG/DL — SIGNIFICANT CHANGE UP (ref 8.5–10.1)
CHLORIDE SERPL-SCNC: 110 MMOL/L — HIGH (ref 96–108)
CO2 SERPL-SCNC: 27 MMOL/L — SIGNIFICANT CHANGE UP (ref 22–31)
CREAT SERPL-MCNC: 0.84 MG/DL — SIGNIFICANT CHANGE UP (ref 0.5–1.3)
CULTURE RESULTS: SIGNIFICANT CHANGE UP
CULTURE RESULTS: SIGNIFICANT CHANGE UP
GLUCOSE SERPL-MCNC: 197 MG/DL — HIGH (ref 70–99)
HCT VFR BLD CALC: 47 % — SIGNIFICANT CHANGE UP (ref 39–50)
HGB BLD-MCNC: 15.3 G/DL — SIGNIFICANT CHANGE UP (ref 13–17)
MCHC RBC-ENTMCNC: 29.3 PG — SIGNIFICANT CHANGE UP (ref 27–34)
MCHC RBC-ENTMCNC: 32.6 GM/DL — SIGNIFICANT CHANGE UP (ref 32–36)
MCV RBC AUTO: 90 FL — SIGNIFICANT CHANGE UP (ref 80–100)
NRBC # BLD: 0 /100 WBCS — SIGNIFICANT CHANGE UP (ref 0–0)
PLATELET # BLD AUTO: 227 K/UL — SIGNIFICANT CHANGE UP (ref 150–400)
POTASSIUM SERPL-MCNC: 3.7 MMOL/L — SIGNIFICANT CHANGE UP (ref 3.5–5.3)
POTASSIUM SERPL-SCNC: 3.7 MMOL/L — SIGNIFICANT CHANGE UP (ref 3.5–5.3)
PROT SERPL-MCNC: 7.8 G/DL — SIGNIFICANT CHANGE UP (ref 6–8.3)
RBC # BLD: 5.22 M/UL — SIGNIFICANT CHANGE UP (ref 4.2–5.8)
RBC # FLD: 13.5 % — SIGNIFICANT CHANGE UP (ref 10.3–14.5)
SODIUM SERPL-SCNC: 145 MMOL/L — SIGNIFICANT CHANGE UP (ref 135–145)
SPECIMEN SOURCE: SIGNIFICANT CHANGE UP
SPECIMEN SOURCE: SIGNIFICANT CHANGE UP
WBC # BLD: 8.9 K/UL — SIGNIFICANT CHANGE UP (ref 3.8–10.5)
WBC # FLD AUTO: 8.9 K/UL — SIGNIFICANT CHANGE UP (ref 3.8–10.5)

## 2021-10-10 PROCEDURE — 99233 SBSQ HOSP IP/OBS HIGH 50: CPT

## 2021-10-10 RX ORDER — HALOPERIDOL DECANOATE 100 MG/ML
5 INJECTION INTRAMUSCULAR ONCE
Refills: 0 | Status: COMPLETED | OUTPATIENT
Start: 2021-10-10 | End: 2021-10-10

## 2021-10-10 RX ADMIN — ALBUTEROL 2 PUFF(S): 90 AEROSOL, METERED ORAL at 18:42

## 2021-10-10 RX ADMIN — ALBUTEROL 2 PUFF(S): 90 AEROSOL, METERED ORAL at 06:52

## 2021-10-10 RX ADMIN — HALOPERIDOL DECANOATE 5 MILLIGRAM(S): 100 INJECTION INTRAMUSCULAR at 01:24

## 2021-10-10 RX ADMIN — Medication 5 MILLILITER(S): at 23:25

## 2021-10-10 RX ADMIN — Medication 81 MILLIGRAM(S): at 11:04

## 2021-10-10 RX ADMIN — ENOXAPARIN SODIUM 40 MILLIGRAM(S): 100 INJECTION SUBCUTANEOUS at 06:09

## 2021-10-10 RX ADMIN — Medication 1 SPRAY(S): at 06:09

## 2021-10-10 RX ADMIN — Medication 5 MILLILITER(S): at 17:34

## 2021-10-10 RX ADMIN — ENOXAPARIN SODIUM 40 MILLIGRAM(S): 100 INJECTION SUBCUTANEOUS at 17:34

## 2021-10-10 RX ADMIN — CLOPIDOGREL BISULFATE 75 MILLIGRAM(S): 75 TABLET, FILM COATED ORAL at 11:04

## 2021-10-10 RX ADMIN — ALBUTEROL 2 PUFF(S): 90 AEROSOL, METERED ORAL at 23:34

## 2021-10-10 RX ADMIN — Medication 6 MILLIGRAM(S): at 06:05

## 2021-10-10 RX ADMIN — ATORVASTATIN CALCIUM 20 MILLIGRAM(S): 80 TABLET, FILM COATED ORAL at 21:28

## 2021-10-10 RX ADMIN — Medication 5 MILLIGRAM(S): at 21:28

## 2021-10-10 RX ADMIN — Medication 1 SPRAY(S): at 17:37

## 2021-10-10 RX ADMIN — Medication 0.25 MILLIGRAM(S): at 14:47

## 2021-10-10 NOTE — PROGRESS NOTE ADULT - PROBLEM SELECTOR PLAN 1
Acute hypoxic respiratory failure 2/2 confirmed COVID-19 infection  - supp O2 prn to maintain O2 sats >88%. Prone PRN; pt satting well on venti mask, will attemt to wean back to O2 by NC  - Decadron and Remdesvir  - pt has been agitated - osisbly 2/2 steroids?  but pt has been on  decadron for several days without mental state changes before, will monitro clsely, on 1:1 observation for now, given ativan 0.25mg x 1 dose  - monitor volume status closely, avoid aggressive hydration  - tylenol prn myalgias, fever; robitussin for cough  - Avoid nebulizers. continue with metered dose inhalers prn.  Pt's family brought in home breo-ellipta, verified with pharmacy  - QTc: 497ms  - daily labs: CBC with diff and CMP  - ferritin, crp, d-dimer, procal at admission then will repeat If clinically worsening every 48-72 hours.  - will initiate VTE ppx: lovenox 40mg BID

## 2021-10-10 NOTE — PROGRESS NOTE ADULT - SUBJECTIVE AND OBJECTIVE BOX
SUBJECTIVE:    No acute events overnight, afebrile, hds.  Pt has had some agitation, and has 1:1 observation.  RN called, about pt being more agitated.    VITAL SIGNS:    Vital Signs Last 24 Hrs  T(C): 36.6 (10 Oct 2021 13:37), Max: 36.7 (09 Oct 2021 20:12)  T(F): 97.9 (10 Oct 2021 13:37), Max: 98.1 (09 Oct 2021 20:12)  HR: 100 (10 Oct 2021 13:37) (76 - 106)  BP: 105/86 (10 Oct 2021 13:37) (105/86 - 145/76)  BP(mean): --  RR: 20 (10 Oct 2021 13:37) (16 - 20)  SpO2: 90% (10 Oct 2021 13:37) (90% - 96%)    PHYSICAL EXAM:     GENERAL: Agitated, restless  HEENT: eomi, MMM  RESPIRATORY: coarse bs b/l  CARDIOVASCULAR: RRR, no murmurs, gallops, rubs  ABDOMINAL: soft, non-tender, non-distended, positive bowel sounds   EXTREMITIES: no clubbing, cyanosis, or edema  NEUROLOGICAL: alert   SKIN: no new rashes or lesions   MUSCULOSKELETAL: no gross joint deformity                          15.3   8.90  )-----------( 227      ( 10 Oct 2021 09:25 )             47.0     10-10    145  |  110<H>  |  23  ----------------------------<  197<H>  3.7   |  27  |  0.84    Ca    8.8      10 Oct 2021 09:25    TPro  7.8  /  Alb  3.3  /  TBili  0.5  /  DBili  .20  /  AST  55<H>  /  ALT  49  /  AlkPhos  191<H>  10-10      CAPILLARY BLOOD GLUCOSE          MEDICATIONS  (STANDING):  ALBUTerol    90 MICROgram(s) HFA Inhaler 2 Puff(s) Inhalation every 6 hours  aspirin  chewable 81 milliGRAM(s) Oral daily  atorvastatin 20 milliGRAM(s) Oral at bedtime  clopidogrel Tablet 75 milliGRAM(s) Oral daily  dexAMETHasone  Injectable 6 milliGRAM(s) IV Push daily  enoxaparin Injectable 40 milliGRAM(s) SubCutaneous every 12 hours  fluticasone furoate/vilanterol 100-25 MICROgram(s) Inhaler 1 Puff(s) Inhalation daily  fluticasone propionate 50 MICROgram(s)/spray Nasal Spray 1 Spray(s) Both Nostrils two times a day  guaifenesin/dextromethorphan Oral Liquid 5 milliLiter(s) Oral every 6 hours  LORazepam   Injectable 0.25 milliGRAM(s) IV Push once  OLANZapine Injectable 5 milliGRAM(s) IntraMuscular once

## 2021-10-10 NOTE — CHART NOTE - NSCHARTNOTEFT_GEN_A_CORE
Called by RN for Pt with agitation. Patient seen and examined at bedside. Patient very agitated, kicking staff and thrashing in bed.     T(C): 36.7 (10-09-21 @ 20:12), Max: 37.1 (10-09-21 @ 13:27)  HR: 106 (10-09-21 @ 20:12) (93 - 106)  BP: 145/76 (10-09-21 @ 20:12) (145/76 - 150/51)  RR: 16 (10-09-21 @ 20:12) (16 - 17)  SpO2: 95% (10-09-21 @ 20:12) (95% - 95%)  Wt(kg): --    Physical Exam:  Gen: agitated, NAD  Neuro: AAOx0 agitated    Assessment/Plan  Patient is a 89 y/o M with PMH of TIA s/p R endarterectomy (2011), hyperlipidemia, hiatal hernia, GERD admitted for COVID-19.    1) Severe aggression/agitation 2/2 hospital acquired delirium   -patient with b/l wrist restraints - will continue at this time to assist with keeping on O2  -patient at risk of harming staff will give 1x dose of 5mg IM haldol at this time  -RN to call with any changes

## 2021-10-11 LAB
ALBUMIN SERPL ELPH-MCNC: 3.2 G/DL — LOW (ref 3.3–5)
ALP SERPL-CCNC: 175 U/L — HIGH (ref 40–120)
ALT FLD-CCNC: 51 U/L — SIGNIFICANT CHANGE UP (ref 12–78)
ANION GAP SERPL CALC-SCNC: 5 MMOL/L — SIGNIFICANT CHANGE UP (ref 5–17)
AST SERPL-CCNC: 61 U/L — HIGH (ref 15–37)
BILIRUB DIRECT SERPL-MCNC: 0.2 MG/DL — SIGNIFICANT CHANGE UP (ref 0.05–0.2)
BILIRUB INDIRECT FLD-MCNC: 0.4 MG/DL — SIGNIFICANT CHANGE UP (ref 0.2–1)
BILIRUB SERPL-MCNC: 0.6 MG/DL — SIGNIFICANT CHANGE UP (ref 0.2–1.2)
BUN SERPL-MCNC: 33 MG/DL — HIGH (ref 7–23)
CALCIUM SERPL-MCNC: 9 MG/DL — SIGNIFICANT CHANGE UP (ref 8.5–10.1)
CHLORIDE SERPL-SCNC: 106 MMOL/L — SIGNIFICANT CHANGE UP (ref 96–108)
CO2 SERPL-SCNC: 32 MMOL/L — HIGH (ref 22–31)
CREAT SERPL-MCNC: 1.1 MG/DL — SIGNIFICANT CHANGE UP (ref 0.5–1.3)
D DIMER BLD IA.RAPID-MCNC: 219 NG/ML DDU — SIGNIFICANT CHANGE UP
GLUCOSE SERPL-MCNC: 178 MG/DL — HIGH (ref 70–99)
HCT VFR BLD CALC: 45.8 % — SIGNIFICANT CHANGE UP (ref 39–50)
HGB BLD-MCNC: 15 G/DL — SIGNIFICANT CHANGE UP (ref 13–17)
MCHC RBC-ENTMCNC: 29.4 PG — SIGNIFICANT CHANGE UP (ref 27–34)
MCHC RBC-ENTMCNC: 32.8 GM/DL — SIGNIFICANT CHANGE UP (ref 32–36)
MCV RBC AUTO: 89.8 FL — SIGNIFICANT CHANGE UP (ref 80–100)
NRBC # BLD: 0 /100 WBCS — SIGNIFICANT CHANGE UP (ref 0–0)
NT-PROBNP SERPL-SCNC: 865 PG/ML — HIGH (ref 0–450)
PLATELET # BLD AUTO: 250 K/UL — SIGNIFICANT CHANGE UP (ref 150–400)
POTASSIUM SERPL-MCNC: 3.9 MMOL/L — SIGNIFICANT CHANGE UP (ref 3.5–5.3)
POTASSIUM SERPL-SCNC: 3.9 MMOL/L — SIGNIFICANT CHANGE UP (ref 3.5–5.3)
PROT SERPL-MCNC: 7.5 G/DL — SIGNIFICANT CHANGE UP (ref 6–8.3)
RBC # BLD: 5.1 M/UL — SIGNIFICANT CHANGE UP (ref 4.2–5.8)
RBC # FLD: 13.5 % — SIGNIFICANT CHANGE UP (ref 10.3–14.5)
SODIUM SERPL-SCNC: 143 MMOL/L — SIGNIFICANT CHANGE UP (ref 135–145)
WBC # BLD: 11.91 K/UL — HIGH (ref 3.8–10.5)
WBC # FLD AUTO: 11.91 K/UL — HIGH (ref 3.8–10.5)

## 2021-10-11 PROCEDURE — 99233 SBSQ HOSP IP/OBS HIGH 50: CPT

## 2021-10-11 PROCEDURE — 99232 SBSQ HOSP IP/OBS MODERATE 35: CPT

## 2021-10-11 RX ORDER — SODIUM CHLORIDE 9 MG/ML
1000 INJECTION, SOLUTION INTRAVENOUS
Refills: 0 | Status: DISCONTINUED | OUTPATIENT
Start: 2021-10-11 | End: 2021-10-12

## 2021-10-11 RX ORDER — QUETIAPINE FUMARATE 200 MG/1
25 TABLET, FILM COATED ORAL AT BEDTIME
Refills: 0 | Status: DISCONTINUED | OUTPATIENT
Start: 2021-10-11 | End: 2021-10-19

## 2021-10-11 RX ORDER — SODIUM CHLORIDE 0.65 %
1 AEROSOL, SPRAY (ML) NASAL
Refills: 0 | Status: DISCONTINUED | OUTPATIENT
Start: 2021-10-11 | End: 2021-10-19

## 2021-10-11 RX ADMIN — ATORVASTATIN CALCIUM 20 MILLIGRAM(S): 80 TABLET, FILM COATED ORAL at 22:06

## 2021-10-11 RX ADMIN — Medication 5 MILLILITER(S): at 12:07

## 2021-10-11 RX ADMIN — CLOPIDOGREL BISULFATE 75 MILLIGRAM(S): 75 TABLET, FILM COATED ORAL at 12:07

## 2021-10-11 RX ADMIN — Medication 1 SPRAY(S): at 17:50

## 2021-10-11 RX ADMIN — Medication 6 MILLIGRAM(S): at 05:21

## 2021-10-11 RX ADMIN — Medication 1 SPRAY(S): at 17:51

## 2021-10-11 RX ADMIN — Medication 5 MILLILITER(S): at 05:21

## 2021-10-11 RX ADMIN — SODIUM CHLORIDE 50 MILLILITER(S): 9 INJECTION, SOLUTION INTRAVENOUS at 17:48

## 2021-10-11 RX ADMIN — QUETIAPINE FUMARATE 25 MILLIGRAM(S): 200 TABLET, FILM COATED ORAL at 22:06

## 2021-10-11 RX ADMIN — Medication 5 MILLILITER(S): at 17:50

## 2021-10-11 RX ADMIN — ENOXAPARIN SODIUM 40 MILLIGRAM(S): 100 INJECTION SUBCUTANEOUS at 05:21

## 2021-10-11 RX ADMIN — ENOXAPARIN SODIUM 40 MILLIGRAM(S): 100 INJECTION SUBCUTANEOUS at 17:50

## 2021-10-11 RX ADMIN — SODIUM CHLORIDE 75 MILLILITER(S): 9 INJECTION, SOLUTION INTRAVENOUS at 16:20

## 2021-10-11 RX ADMIN — Medication 81 MILLIGRAM(S): at 12:07

## 2021-10-11 RX ADMIN — ALBUTEROL 2 PUFF(S): 90 AEROSOL, METERED ORAL at 12:09
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
## 2021-10-11 NOTE — CHART NOTE - NSCHARTNOTEFT_GEN_A_CORE
called by rn to renew wrist restraints. as per rn, patient attempting to remove nasal cannula and punching staff members. will renew wrist restraints called by rn to renew wrist restraints. as per rn, patient attempting to remove nasal cannula and punching staff members. Patient seen and examined at bedside. lethargic, however, pulling at wrist restraints, appears agitated upon examination.     ICU Vital Signs Last 24 Hrs  T(C): 36.8 (10 Oct 2021 20:28), Max: 36.8 (10 Oct 2021 20:28)  T(F): 98.2 (10 Oct 2021 20:28), Max: 98.2 (10 Oct 2021 20:28)  HR: 68 (10 Oct 2021 20:28) (68 - 100)  BP: 134/89 (10 Oct 2021 20:28) (105/86 - 134/89)  BP(mean): --  ABP: --  ABP(mean): --  RR: 18 (10 Oct 2021 20:28) (18 - 20)  SpO2: 96% (10 Oct 2021 20:28) (90% - 96%)    Physical Exam:  Gen: NAD, agitated  Neuro: AAO X 0,   Cardio: RRR, +S1/S2  Pulm: audible upper airway congestion      Assessment/Plan  Patient is a 89 y/o M with PMH of TIA s/p R endarterectomy (2011), hyperlipidemia, hiatal hernia, GERD admitted for COVID-19.    1. Severe aggression/agitation 2/2 hospital acquired delirium   -patient with b/l wrist restraints - will continue at this time to assist with keeping on O2  - patient is at risk of harming himself and staff members    2. Upper Airway Congestion  - suctioned with improvement of respiratory status to clear airways called by rn to renew wrist restraints. as per rn, patient attempting to remove nasal cannula and punching staff members. Patient seen and examined at bedside. nonverbal, however, pulling at wrist restraints, appears agitated upon examination.     ICU Vital Signs Last 24 Hrs  T(C): 36.8 (10 Oct 2021 20:28), Max: 36.8 (10 Oct 2021 20:28)  T(F): 98.2 (10 Oct 2021 20:28), Max: 98.2 (10 Oct 2021 20:28)  HR: 68 (10 Oct 2021 20:28) (68 - 100)  BP: 134/89 (10 Oct 2021 20:28) (105/86 - 134/89)  BP(mean): --  ABP: --  ABP(mean): --  RR: 18 (10 Oct 2021 20:28) (18 - 20)  SpO2: 96% (10 Oct 2021 20:28) (90% - 96%)    Physical Exam:  Gen: NAD, agitated  Neuro: AAO X 0,   Cardio: RRR, +S1/S2  Pulm: audible upper airway congestion      Assessment/Plan  Patient is a 89 y/o M with PMH of TIA s/p R endarterectomy (2011), hyperlipidemia, hiatal hernia, GERD admitted for COVID-19.    1. Severe aggression/agitation 2/2 hospital acquired delirium   -patient with b/l wrist restraints - will continue at this time to assist with keeping on O2  - patient is at risk of harming himself and staff members    2. Upper Airway Congestion  - suctioned with improvement of respiratory status to clear airways

## 2021-10-11 NOTE — PROGRESS NOTE ADULT - SUBJECTIVE AND OBJECTIVE BOX
[facility]  INFECTIOUS DISEASES   [address] [place] [address]  Tel: [phone]     Fax: [phone]  =======================================================    [id number]  JUSTIN DEY       Follow up: COVID 19    Still very confused, has a one to one. 15L venti mask.    No fever.     PAST MEDICAL & SURGICAL HISTORY:  HLD (hyperlipidemia)  Osteoarthritis  CAD (coronary artery disease)  H/O rotator cuff surgery  R shoulder    Social Hx: no smoking, ETOH or drugs     FAMILY HISTORY:  No pertinent family history in first degree relatives      Allergies  No Known Allergies    Antibiotics:  None      REVIEW OF SYSTEMS:  Not answering     Physical Exam:  Vital Signs Last 24 Hrs  T(C): 36.6 (11 Oct 2021 13:56), Max: 36.8 (10 Oct 2021 20:28)  T(F): 97.8 (11 Oct 2021 13:56), Max: 98.3 (11 Oct 2021 06:20)  HR: 86 (11 Oct 2021 13:56) (68 - 100)  BP: 109/65 (11 Oct 2021 13:56) (109/65 - 134/89)  BP(mean): --  RR: 18 (11 Oct 2021 13:56) (18 - 19)  SpO2: 90% (11 Oct 2021 13:56) (90% - 96%)  GEN: NAD, obese   HEENT: normocephalic and atraumatic. EOMI. PERRL.    NECK: Supple.  No lymphadenopathy   LUNGS: scattered rhonchi   HEART: Regular rate and rhythm   ABDOMEN: Soft, nontender, and nondistended.  Positive bowel sounds.    : No CVA tenderness  EXTREMITIES: Without any cyanosis, clubbing, rash, lesions or edema.  NEUROLOGIC: grossly intact.  PSYCHIATRIC: confused   SKIN: No ulceration or induration present.    Labs:                        15.0   11.91 )-----------( 250      ( 11 Oct 2021 07:43 )             45.8     10-11    143  |  106  |  33<H>  ----------------------------<  178<H>  3.9   |  32<H>  |  1.10    Ca    9.0      11 Oct 2021 07:43    TPro  7.5  /  Alb  3.2<L>  /  TBili  0.6  /  DBili  .20  /  AST  61<H>  /  ALT  51  /  AlkPhos  175<H>  10-11    Culture - Blood (collected 10-05-21 @ 17:28)  Source: .Blood Blood-Peripheral  Final Report (10-10-21 @ 18:01):    No Growth Final    Culture - Blood (collected 10-05-21 @ 17:28)  Source: .Blood Blood-Peripheral  Final Report (10-10-21 @ 18:01):    No Growth Final    Culture - Urine (collected 10-04-21 @ 22:01)  Source: Clean Catch Clean Catch (Midstream)  Final Report (10-06-21 @ 12:25):    <10,000 CFU/mL Normal Urogenital Monisha    WBC Count: 11.91 K/uL (10-11-21 @ 07:43)  WBC Count: 8.90 K/uL (10-10-21 @ 09:25)  WBC Count: 8.61 K/uL (10-08-21 @ 09:43)  WBC Count: 7.89 K/uL (10-07-21 @ 08:31)    Creatinine, Serum: 1.10 mg/dL (10-11-21 @ 07:43)  Creatinine, Serum: 0.84 mg/dL (10-10-21 @ 09:25)  Creatinine, Serum: 1.00 mg/dL (10-08-21 @ 09:43)  Creatinine, Serum: 0.86 mg/dL (10-07-21 @ 08:15)  Creatinine, Serum: 0.86 mg/dL (10-07-21 @ 08:15)    C-Reactive Protein, Serum: 13 mg/L (10-05-21 @ 17:31)    Ferritin, Serum: 97 ng/mL (10-05-21 @ 20:16)    Procalcitonin, Serum: <0.05 (10-05-21 @ 13:22)     COVID-19 PCR: Detected (10-05-21 @ 13:16)  COVID-19 PCR: Detected (10-04-21 @ 12:44)    All imaging and other data have been reviewed.  < from: Xray Chest 1 View- PORTABLE-Urgent (10.04.21 @ 12:41) >  EXAM:  XR CHEST PORTABLE URGENT 1V                        PROCEDURE DATE:  10/04/2021    INTERPRETATION:  Weakness.  AP chest.  Heart magnified by projection. Calcified aortic arch.  streaky fibrotic/atelectatic changes left base withmild elevation left hemidiaphragm. No consolidation or effusion.  IMPRESSION: No active infiltrates.    Assessment and Plan:   89y/o man with PMH of CAD, and OA was admitted today with vomiting and SOB for two days. he also has generalized weakness, body aches and headache. He recently came back from [place] and states  that he had contact with Known COVID case in [place]. He was seen in ED on 10/4 and had a positive COVID test.  10/09: Mental status worsening, now has 1:1, O2 requirement is higher today, on Venti mask 15L.     COVID 19   - BMP, CBC w diff, NLR. Procalcitonin, Ferritin, CRP, LDH and D dimer for the start and periodically can be repeated.   - Cultures negative   - CXR with no opacities   - Completed Remdesivir total 5days on 10/10  - Continue Dexamethasone 6mg po daily for 10days  - Watch O2 sat closely and taper as tolerated.   - No antibiotics at this time.  - Anticoagulation as per protocol  - Can given one dose Tocilizumab if O2 requirement goes up and advances to HFNC, still on Venti mask.     Will follow PRN.     Dr. Salas Hernandez   Infectious Diseases   Please call [phone] between 8am and 6pm, call [phone] after 6pm or weekends.

## 2021-10-11 NOTE — CONSULT NOTE ADULT - ASSESSMENT
89 y/o M with PMH of TIA s/p R endarterectomy (2011), hyperlipidemia, hiatal hernia, GERD admitted for COVID-19.      [facility] rx regimen  decadron - hypoxemia and covid  repeat d dimer  tylenol - cough rx regimen PRN  GERD - PPI - hiatal hernia - PPI -   isolation precs  dvt p  assist with needs - ADL  delirium - fall prec - monitor for needs  supportive medical regimen in progress  prognosis guarded - advanced age - delirium - covid 19 with hypoxemia  completed Remdesivir

## 2021-10-11 NOTE — PROGRESS NOTE ADULT - PROBLEM SELECTOR PLAN 1
-Acute hypoxic respiratory failure 2/2 confirmed COVID-19 infection  - supp O2 prn to maintain O2 sats >88%. Prone PRN; pt satting well on venti mask, will attemt to wean back to O2 by NC  - On Decadron. S/p  Remdesvir  - pt has been agitated - posisbly 2/2 steroids and encephalopathy,  will monitor closely, on 1:1 observation for now. Will probably remove wrist restraints today if no concern for agitation or safety otherwise will need to continue. Added Seroquel 25mg at bedtime. Neuro and Psych consulted.     - monitor volume status closely, avoid aggressive hydration  - tylenol prn myalgias, fever; robitussin for cough  - Avoid nebulizers. continue with metered dose inhalers prn.  Pt's family brought in home breo-ellipta, verified with pharmacy  - QTc: 497ms  - daily labs: CBC with diff and CMP  - ferritin, crp, d-dimer, procal at admission then will repeat If clinically worsening every 48-72 hours.  - will initiate VTE ppx: lovenox 40mg BID -Acute hypoxic respiratory failure 2/2 confirmed COVID-19 infection  - supp O2 prn to maintain O2 sats >88%. Prone PRN; pt satting well on venti mask, will attemt to wean back to O2 by NC  - On Decadron. S/p  Remdesvir  - pt has been agitated - posisbly 2/2 steroids and encephalopathy,  will monitor closely, on 1:1 observation for now. Will probably remove wrist restraints today if no concern for agitation or safety otherwise will need to continue. Added Seroquel 25mg at bedtime. Neuro and Psych consulted.   -Patient with poor oral intake and decreased urine output, will start gentle hydration for few hours, until able to tolerate po.    - monitor volume status closely, avoid aggressive hydration  - tylenol prn myalgias, fever; robitussin for cough  - Avoid nebulizers. continue with metered dose inhalers prn.  Pt's family brought in home breo-ellipta, verified with pharmacy  - QTc: 497ms  - daily labs: CBC with diff and CMP  - ferritin, crp, d-dimer, procal at admission then will repeat If clinically worsening every 48-72 hours.  - will initiate VTE ppx: lovenox 40mg BID

## 2021-10-11 NOTE — PROGRESS NOTE ADULT - TIME BILLING
Patient seen and examined at bedside. Chart, meds, labs, vitals reviewed. Plan d/w RN Patient seen and examined at bedside. Chart, meds, labs, vitals reviewed. Plan d/w RN. Prognosis is guarded.

## 2021-10-11 NOTE — PROGRESS NOTE ADULT - SUBJECTIVE AND OBJECTIVE BOX
Patient is a 89y old  Male who presents with a chief complaint of COVID-19 (10 Oct 2021 14:24)      INTERVAL HPI/OVERNIGHT EVENTS: Seen and examined at bedside. Events noted. On 1:1 observation, + wrist restraints for agitation and safety.     MEDICATIONS  (STANDING):  ALBUTerol    90 MICROgram(s) HFA Inhaler 2 Puff(s) Inhalation every 6 hours  aspirin  chewable 81 milliGRAM(s) Oral daily  atorvastatin 20 milliGRAM(s) Oral at bedtime  clopidogrel Tablet 75 milliGRAM(s) Oral daily  dexAMETHasone  Injectable 6 milliGRAM(s) IV Push daily  enoxaparin Injectable 40 milliGRAM(s) SubCutaneous every 12 hours  fluticasone furoate/vilanterol 100-25 MICROgram(s) Inhaler 1 Puff(s) Inhalation daily  fluticasone propionate 50 MICROgram(s)/spray Nasal Spray 1 Spray(s) Both Nostrils two times a day  guaifenesin/dextromethorphan Oral Liquid 5 milliLiter(s) Oral every 6 hours  OLANZapine Injectable 5 milliGRAM(s) IntraMuscular once    MEDICATIONS  (PRN):  acetaminophen   Tablet .. 650 milliGRAM(s) Oral every 6 hours PRN Temp greater or equal to 38C (100.4F), Mild Pain (1 - 3)  melatonin 5 milliGRAM(s) Oral at bedtime PRN Insomnia      Allergies    No Known Allergies    Intolerances        REVIEW OF SYSTEMS:  Unable to obtain due to mental status   Vital Signs Last 24 Hrs  T(C): 36.8 (11 Oct 2021 06:20), Max: 36.8 (10 Oct 2021 20:28)  T(F): 98.3 (11 Oct 2021 06:20), Max: 98.3 (11 Oct 2021 06:20)  HR: 100 (11 Oct 2021 06:20) (68 - 100)  BP: 120/70 (11 Oct 2021 06:20) (105/86 - 134/89)  BP(mean): --  RR: 19 (11 Oct 2021 06:20) (18 - 20)  SpO2: 93% (11 Oct 2021 06:20) (90% - 96%)    PHYSICAL EXAM:  GENERAL: NAD, Confused  HEAD:  Atraumatic, Normocephalic  EYES: EOMI, PERRLA, conjunctiva and sclera clear  ENMT: No tonsillar erythema, exudates, or enlargement; Moist mucous membranes  NECK: Supple, No JVD, Normal thyroid  CHEST/LUNG: Clear to auscultation bilaterally; No wheezing   HEART: S1S2+, Regular rate and rhythm  ABDOMEN: Soft, Nontender, Nondistended; Bowel sounds present  EXTREMITIES:  2+ Peripheral Pulses, No clubbing, cyanosis, or edema  LYMPH: No lymphadenopathy noted  SKIN: No rashes, warm, dry     LABS:                        15.0   11.91 )-----------( 250      ( 11 Oct 2021 07:43 )             45.8     11 Oct 2021 07:43    143    |  106    |  33     ----------------------------<  178    3.9     |  32     |  1.10     Ca    9.0        11 Oct 2021 07:43    TPro  7.5    /  Alb  3.2    /  TBili  0.6    /  DBili  .20    /  AST  61     /  ALT  51     /  AlkPhos  175    11 Oct 2021 07:43      CAPILLARY BLOOD GLUCOSE        BLOOD CULTURE    RADIOLOGY & ADDITIONAL TESTS:    Imaging Personally Reviewed:  [ ] YES     Consultant(s) Notes Reviewed:      Care Discussed with Consultants/Other Providers:

## 2021-10-12 DIAGNOSIS — E87.6 HYPOKALEMIA: ICD-10-CM

## 2021-10-12 LAB
ALBUMIN SERPL ELPH-MCNC: 2.7 G/DL — LOW (ref 3.3–5)
ALBUMIN SERPL ELPH-MCNC: 2.7 G/DL — LOW (ref 3.3–5)
ALP SERPL-CCNC: 149 U/L — HIGH (ref 40–120)
ALP SERPL-CCNC: 158 U/L — HIGH (ref 40–120)
ALT FLD-CCNC: 43 U/L — SIGNIFICANT CHANGE UP (ref 12–78)
ALT FLD-CCNC: 43 U/L — SIGNIFICANT CHANGE UP (ref 12–78)
ANION GAP SERPL CALC-SCNC: 5 MMOL/L — SIGNIFICANT CHANGE UP (ref 5–17)
AST SERPL-CCNC: 34 U/L — SIGNIFICANT CHANGE UP (ref 15–37)
AST SERPL-CCNC: 35 U/L — SIGNIFICANT CHANGE UP (ref 15–37)
BASOPHILS # BLD AUTO: 0 K/UL — SIGNIFICANT CHANGE UP (ref 0–0.2)
BASOPHILS NFR BLD AUTO: 0 % — SIGNIFICANT CHANGE UP (ref 0–2)
BILIRUB DIRECT SERPL-MCNC: 0.2 MG/DL — SIGNIFICANT CHANGE UP (ref 0.05–0.2)
BILIRUB INDIRECT FLD-MCNC: 0.3 MG/DL — SIGNIFICANT CHANGE UP (ref 0.2–1)
BILIRUB SERPL-MCNC: 0.5 MG/DL — SIGNIFICANT CHANGE UP (ref 0.2–1.2)
BILIRUB SERPL-MCNC: 0.5 MG/DL — SIGNIFICANT CHANGE UP (ref 0.2–1.2)
BUN SERPL-MCNC: 38 MG/DL — HIGH (ref 7–23)
CALCIUM SERPL-MCNC: 8.3 MG/DL — LOW (ref 8.5–10.1)
CHLORIDE SERPL-SCNC: 106 MMOL/L — SIGNIFICANT CHANGE UP (ref 96–108)
CO2 SERPL-SCNC: 30 MMOL/L — SIGNIFICANT CHANGE UP (ref 22–31)
CREAT SERPL-MCNC: 0.8 MG/DL — SIGNIFICANT CHANGE UP (ref 0.5–1.3)
D DIMER BLD IA.RAPID-MCNC: 246 NG/ML DDU — HIGH
EOSINOPHIL # BLD AUTO: 0.01 K/UL — SIGNIFICANT CHANGE UP (ref 0–0.5)
EOSINOPHIL NFR BLD AUTO: 0.1 % — SIGNIFICANT CHANGE UP (ref 0–6)
GLUCOSE SERPL-MCNC: 172 MG/DL — HIGH (ref 70–99)
HCT VFR BLD CALC: 41 % — SIGNIFICANT CHANGE UP (ref 39–50)
HGB BLD-MCNC: 13.6 G/DL — SIGNIFICANT CHANGE UP (ref 13–17)
IMM GRANULOCYTES NFR BLD AUTO: 0.4 % — SIGNIFICANT CHANGE UP (ref 0–1.5)
LYMPHOCYTES # BLD AUTO: 0.94 K/UL — LOW (ref 1–3.3)
LYMPHOCYTES # BLD AUTO: 10.4 % — LOW (ref 13–44)
MAGNESIUM SERPL-MCNC: 2.6 MG/DL — SIGNIFICANT CHANGE UP (ref 1.6–2.6)
MCHC RBC-ENTMCNC: 29.6 PG — SIGNIFICANT CHANGE UP (ref 27–34)
MCHC RBC-ENTMCNC: 33.2 GM/DL — SIGNIFICANT CHANGE UP (ref 32–36)
MCV RBC AUTO: 89.3 FL — SIGNIFICANT CHANGE UP (ref 80–100)
MONOCYTES # BLD AUTO: 0.84 K/UL — SIGNIFICANT CHANGE UP (ref 0–0.9)
MONOCYTES NFR BLD AUTO: 9.3 % — SIGNIFICANT CHANGE UP (ref 2–14)
NEUTROPHILS # BLD AUTO: 7.24 K/UL — SIGNIFICANT CHANGE UP (ref 1.8–7.4)
NEUTROPHILS NFR BLD AUTO: 79.8 % — HIGH (ref 43–77)
NRBC # BLD: 0 /100 WBCS — SIGNIFICANT CHANGE UP (ref 0–0)
PHOSPHATE SERPL-MCNC: 2.8 MG/DL — SIGNIFICANT CHANGE UP (ref 2.5–4.5)
PLATELET # BLD AUTO: 246 K/UL — SIGNIFICANT CHANGE UP (ref 150–400)
POTASSIUM SERPL-MCNC: 3.3 MMOL/L — LOW (ref 3.5–5.3)
POTASSIUM SERPL-SCNC: 3.3 MMOL/L — LOW (ref 3.5–5.3)
PROT SERPL-MCNC: 6.8 G/DL — SIGNIFICANT CHANGE UP (ref 6–8.3)
PROT SERPL-MCNC: 6.9 G/DL — SIGNIFICANT CHANGE UP (ref 6–8.3)
RBC # BLD: 4.59 M/UL — SIGNIFICANT CHANGE UP (ref 4.2–5.8)
RBC # FLD: 13.5 % — SIGNIFICANT CHANGE UP (ref 10.3–14.5)
SODIUM SERPL-SCNC: 141 MMOL/L — SIGNIFICANT CHANGE UP (ref 135–145)
WBC # BLD: 9.07 K/UL — SIGNIFICANT CHANGE UP (ref 3.8–10.5)
WBC # FLD AUTO: 9.07 K/UL — SIGNIFICANT CHANGE UP (ref 3.8–10.5)

## 2021-10-12 PROCEDURE — 99233 SBSQ HOSP IP/OBS HIGH 50: CPT

## 2021-10-12 PROCEDURE — 99232 SBSQ HOSP IP/OBS MODERATE 35: CPT

## 2021-10-12 RX ORDER — POTASSIUM CHLORIDE 20 MEQ
40 PACKET (EA) ORAL EVERY 4 HOURS
Refills: 0 | Status: COMPLETED | OUTPATIENT
Start: 2021-10-12 | End: 2021-10-12

## 2021-10-12 RX ADMIN — Medication 6 MILLIGRAM(S): at 05:09

## 2021-10-12 RX ADMIN — ATORVASTATIN CALCIUM 20 MILLIGRAM(S): 80 TABLET, FILM COATED ORAL at 22:55

## 2021-10-12 RX ADMIN — ALBUTEROL 2 PUFF(S): 90 AEROSOL, METERED ORAL at 17:52

## 2021-10-12 RX ADMIN — Medication 81 MILLIGRAM(S): at 12:17

## 2021-10-12 RX ADMIN — ENOXAPARIN SODIUM 40 MILLIGRAM(S): 100 INJECTION SUBCUTANEOUS at 05:09

## 2021-10-12 RX ADMIN — Medication 40 MILLIEQUIVALENT(S): at 12:17

## 2021-10-12 RX ADMIN — Medication 5 MILLILITER(S): at 12:17

## 2021-10-12 RX ADMIN — Medication 1 SPRAY(S): at 05:09

## 2021-10-12 RX ADMIN — ENOXAPARIN SODIUM 40 MILLIGRAM(S): 100 INJECTION SUBCUTANEOUS at 17:51

## 2021-10-12 RX ADMIN — QUETIAPINE FUMARATE 25 MILLIGRAM(S): 200 TABLET, FILM COATED ORAL at 22:55

## 2021-10-12 RX ADMIN — CLOPIDOGREL BISULFATE 75 MILLIGRAM(S): 75 TABLET, FILM COATED ORAL at 12:17

## 2021-10-12 RX ADMIN — Medication 1 SPRAY(S): at 17:51

## 2021-10-12 RX ADMIN — ALBUTEROL 2 PUFF(S): 90 AEROSOL, METERED ORAL at 13:00

## 2021-10-12 RX ADMIN — Medication 5 MILLILITER(S): at 05:09

## 2021-10-12 RX ADMIN — Medication 40 MILLIEQUIVALENT(S): at 17:51

## 2021-10-12 RX ADMIN — Medication 5 MILLILITER(S): at 17:51

## 2021-10-12 NOTE — PROGRESS NOTE ADULT - PROBLEM SELECTOR PLAN 4
DVT Prophylaxis:  - Lovenox 40mg q12. Pending d-dimer level will consider increasing to therapeutic dosing of lovenox - Continue home Lipitor 20mg qd  - ASA 81mg qd

## 2021-10-12 NOTE — DIETITIAN INITIAL EVALUATION ADULT. - OTHER INFO
89 y/o male adm with COVID 19. PMH CAD, HTN. Pt confused, EMR reviewed. Spoke with CNA. Pt total feed. Pt consumed 25% of meals this am. Unsure if Ensure is being consumed. CNA will attempt to feed Ensure to pt at meal times.  Last BM 10/11. 1+ generalized edema.

## 2021-10-12 NOTE — PROGRESS NOTE ADULT - PROBLEM SELECTOR PLAN 2
Patient with hx of TIA s/p R endarterectomy (2011)  - Continue home Plavix 75mg qd, Lipitor 20mg qd  - On  ASA 81mg qd Ordered Potassium Supplement  Check K, Mg, phos in am

## 2021-10-12 NOTE — GOALS OF CARE CONVERSATION - ADVANCED CARE PLANNING - CONVERSATION DETAILS
Writer spoke with pts good friend Jose Barbergiovani. Reviewed patient's medical and social history as well as events leading to patient's hospitalization. Writer discussed patient's current diagnosis (Covid,AMS,TIA,s/p R endarterectomy,HLD,GERD), medical condition and management,   prognosis, and life expectancy. Inquired about patient's wishes regarding extent of medical care to be provided including escalation of medical care into the ICU and use of vasopressor support. In addition, the writer inquired about thoughts regarding cardiopulmnary resuscitation, artificial nutrition and hydration including use of feeding tubes and IVF, antibiotics, and further investigative studies such as blood draws and radiology. Jose showed minimal insight into medical condition. All questions answered. Writer recommended Jose try to get family information from pt. Jose would be considered his surrogate to make decision.  Psychosocial support provided.

## 2021-10-12 NOTE — PROGRESS NOTE ADULT - SUBJECTIVE AND OBJECTIVE BOX
Patient is a 89y old  Male who presents with a chief complaint of COVID-19 (12 Oct 2021 06:12)       INTERVAL HPI/OVERNIGHT EVENTS: Patient seen and examined at bedside. Calm, sleepy but responsive. Won't answer though     MEDICATIONS  (STANDING):  ALBUTerol    90 MICROgram(s) HFA Inhaler 2 Puff(s) Inhalation every 6 hours  aspirin  chewable 81 milliGRAM(s) Oral daily  atorvastatin 20 milliGRAM(s) Oral at bedtime  clopidogrel Tablet 75 milliGRAM(s) Oral daily  dexAMETHasone  Injectable 6 milliGRAM(s) IV Push daily  enoxaparin Injectable 40 milliGRAM(s) SubCutaneous every 12 hours  fluticasone furoate/vilanterol 100-25 MICROgram(s) Inhaler 1 Puff(s) Inhalation daily  fluticasone propionate 50 MICROgram(s)/spray Nasal Spray 1 Spray(s) Both Nostrils two times a day  guaifenesin/dextromethorphan Oral Liquid 5 milliLiter(s) Oral every 6 hours  OLANZapine Injectable 5 milliGRAM(s) IntraMuscular once  potassium chloride    Tablet ER 40 milliEquivalent(s) Oral every 4 hours  QUEtiapine 25 milliGRAM(s) Oral at bedtime  sodium chloride 0.65% Nasal 1 Spray(s) Both Nostrils two times a day    MEDICATIONS  (PRN):  acetaminophen   Tablet .. 650 milliGRAM(s) Oral every 6 hours PRN Temp greater or equal to 38C (100.4F), Mild Pain (1 - 3)  melatonin 5 milliGRAM(s) Oral at bedtime PRN Insomnia      Allergies    No Known Allergies    Intolerances        REVIEW OF SYSTEMS:  Unable to obtain, patient confused and wont answer questions   Vital Signs Last 24 Hrs  T(C): 36.3 (12 Oct 2021 04:52), Max: 36.6 (11 Oct 2021 13:56)  T(F): 97.4 (12 Oct 2021 04:52), Max: 97.8 (11 Oct 2021 13:56)  HR: 81 (12 Oct 2021 04:52) (77 - 86)  BP: 136/73 (12 Oct 2021 04:52) (109/65 - 154/65)  BP(mean): --  RR: 18 (12 Oct 2021 04:52) (18 - 18)  SpO2: 91% (12 Oct 2021 04:52) (90% - 91%)    PHYSICAL EXAM:  GENERAL: NAD, Confused  HEAD:  Atraumatic, Normocephalic  EYES: EOMI, PERRLA, conjunctiva and sclera clear  ENMT: No tonsillar erythema, exudates, or enlargement; Moist mucous membranes  NECK: Supple, No JVD, Normal thyroid  CHEST/LUNG: Clear to auscultation bilaterally; No wheezing   HEART: S1S2+, Regular rate and rhythm  ABDOMEN: Soft, Nontender, Nondistended; Bowel sounds present  EXTREMITIES:  2+ Peripheral Pulses, No clubbing, cyanosis, or edema  LYMPH: No lymphadenopathy noted  SKIN: No rashes, warm, dry   LABS:                        13.6   9.07  )-----------( 246      ( 12 Oct 2021 07:09 )             41.0     12 Oct 2021 07:09    141    |  106    |  38     ----------------------------<  172    3.3     |  30     |  0.80     Ca    8.3        12 Oct 2021 07:09  Phos  2.8       12 Oct 2021 07:09  Mg     2.6       12 Oct 2021 07:09    TPro  6.9    /  Alb  2.7    /  TBili  0.5    /  DBili  .20    /  AST  35     /  ALT  43     /  AlkPhos  158    12 Oct 2021 07:09      CAPILLARY BLOOD GLUCOSE        BLOOD CULTURE    RADIOLOGY & ADDITIONAL TESTS:    Imaging Personally Reviewed:  [ ] YES     Consultant(s) Notes Reviewed:      Care Discussed with Consultants/Other Providers:

## 2021-10-12 NOTE — DIETITIAN INITIAL EVALUATION ADULT. - PROBLEM SELECTOR PLAN 1
Acute hypoxic respiratory failure 2/2 confirmed COVID-19 infection  - supp O2 prn to maintain O2 sats >88%. Prone PRN  - start Decadron and Remdesvir  - monitor volume status closely, avoid aggressive hydration  - tylenol prn myalgias, fever  - Avoid nebulizers. continue with metered dose inhalers prn.  - QTc: 497ms  - daily labs: CBC with diff and CMP  - ferritin, crp, d-dimer, procal at admission then will repeat If clinically worsening every 48-72 hours.  - will initiate VTE ppx: lovenox 40mg BID

## 2021-10-12 NOTE — PROGRESS NOTE ADULT - SUBJECTIVE AND OBJECTIVE BOX
[facility]  INFECTIOUS DISEASES   [address] [place] [address]  Tel: [phone]     Fax: [phone]  =======================================================    [id number]  JUSTIN DEY       Follow up: COVID 19    Patient seen and examined.   Still very confused, 15L NRB.   No fever.     PAST MEDICAL & SURGICAL HISTORY:  HLD (hyperlipidemia)  Osteoarthritis  CAD (coronary artery disease)  H/O rotator cuff surgery  R shoulder    Social Hx: no smoking, ETOH or drugs     FAMILY HISTORY:  No pertinent family history in first degree relatives      Allergies  No Known Allergies    Antibiotics:  None      REVIEW OF SYSTEMS:  Not answering     Physical Exam:  Vital Signs Last 24 Hrs  T(C): 37.1 (12 Oct 2021 13:14), Max: 37.1 (12 Oct 2021 13:14)  T(F): 98.7 (12 Oct 2021 13:14), Max: 98.7 (12 Oct 2021 13:14)  HR: 80 (12 Oct 2021 13:14) (77 - 86)  BP: 118/62 (12 Oct 2021 13:14) (109/65 - 154/65)  BP(mean): --  RR: 18 (12 Oct 2021 13:14) (18 - 18)  SpO2: 90% (12 Oct 2021 13:14) (90% - 91%)  GEN: NAD, obese   HEENT: normocephalic and atraumatic. EOMI. PERRL.    NECK: Supple.  No lymphadenopathy   LUNGS: scattered rhonchi   HEART: Regular rate and rhythm   ABDOMEN: Soft, nontender, and nondistended.  Positive bowel sounds.    : No CVA tenderness  EXTREMITIES: Without any cyanosis, clubbing, rash, lesions or edema.  NEUROLOGIC: grossly intact.  PSYCHIATRIC: confused   SKIN: No ulceration or induration present.    Labs:                        13.6   9.07  )-----------( 246      ( 12 Oct 2021 07:09 )             41.0     10-12    141  |  106  |  38<H>  ----------------------------<  172<H>  3.3<L>   |  30  |  0.80    Ca    8.3<L>      12 Oct 2021 07:09  Phos  2.8     10-12  Mg     2.6     10-12    TPro  6.9  /  Alb  2.7<L>  /  TBili  0.5  /  DBili  .20  /  AST  35  /  ALT  43  /  AlkPhos  158<H>  10-12    Culture - Blood (collected 10-05-21 @ 17:28)  Source: .Blood Blood-Peripheral  Final Report (10-10-21 @ 18:01):    No Growth Final    Culture - Blood (collected 10-05-21 @ 17:28)  Source: .Blood Blood-Peripheral  Final Report (10-10-21 @ 18:01):    No Growth Final    Culture - Urine (collected 10-04-21 @ 22:01)  Source: Clean Catch Clean Catch (Midstream)  Final Report (10-06-21 @ 12:25):    <10,000 CFU/mL Normal Urogenital Monisha    WBC Count: 9.07 K/uL (10-12-21 @ 07:09)  WBC Count: 11.91 K/uL (10-11-21 @ 07:43)  WBC Count: 8.90 K/uL (10-10-21 @ 09:25)  WBC Count: 8.61 K/uL (10-08-21 @ 09:43)    Creatinine, Serum: 0.80 mg/dL (10-12-21 @ 07:09)  Creatinine, Serum: 1.10 mg/dL (10-11-21 @ 07:43)  Creatinine, Serum: 0.84 mg/dL (10-10-21 @ 09:25)  Creatinine, Serum: 1.00 mg/dL (10-08-21 @ 09:43)    C-Reactive Protein, Serum: 13 mg/L (10-05-21 @ 17:31)    Ferritin, Serum: 97 ng/mL (10-05-21 @ 20:16)    Procalcitonin, Serum: <0.05 (10-05-21 @ 13:22)     COVID-19 PCR: Detected (10-05-21 @ 13:16)  COVID-19 PCR: Detected (10-04-21 @ 12:44)    All imaging and other data have been reviewed.  < from: Xray Chest 1 View- PORTABLE-Urgent (10.04.21 @ 12:41) >  EXAM:  XR CHEST PORTABLE URGENT 1V                        PROCEDURE DATE:  10/04/2021    INTERPRETATION:  Weakness.  AP chest.  Heart magnified by projection. Calcified aortic arch.  streaky fibrotic/atelectatic changes left base withmild elevation left hemidiaphragm. No consolidation or effusion.  IMPRESSION: No active infiltrates.    Assessment and Plan:   89y/o man with PMH of CAD, and OA was admitted today with vomiting and SOB for two days. he also has generalized weakness, body aches and headache. He recently came back from [place] and states  that he had contact with Known COVID case in [place]. He was seen in ED on 10/4 and had a positive COVID test.  10/09: Mental status worsening, now has 1:1, O2 requirement is higher today, on Venti mask 15L.     COVID 19   - BMP, CBC w diff, NLR. Procalcitonin, Ferritin, CRP, LDH and D dimer for the start and periodically can be repeated.   - Cultures negative   - CXR with no opacities   - Completed Remdesivir total 5days on 10/10  - Continue Dexamethasone 6mg po daily for 10days  - Watch O2 sat closely and taper as tolerated.   - No antibiotics at this time.  - Anticoagulation as per protocol  - Can given one dose Tocilizumab if O2 requirement goes up and advances to HFNC, still on Venti mask/NRB    Will follow PRN, Pleas call with any question. .     Dr. Salas Hernandez   Infectious Diseases   Please call [phone] between 8am and 6pm, call [phone] after 6pm or weekends.     [facility]  INFECTIOUS DISEASES   [address] [place] [address]  Tel: [phone]     Fax: [phone]  =======================================================    [id number]  JUSTIN DEY       Follow up: COVID 19    Patient seen and examined.   Still very confused, 15L NRB.   No fever.     PAST MEDICAL & SURGICAL HISTORY:  HLD (hyperlipidemia)  Osteoarthritis  CAD (coronary artery disease)  H/O rotator cuff surgery  R shoulder    Social Hx: no smoking, ETOH or drugs     FAMILY HISTORY:  No pertinent family history in first degree relatives      Allergies  No Known Allergies    Antibiotics:  None      REVIEW OF SYSTEMS:  Not answering     Physical Exam:  Vital Signs Last 24 Hrs  T(C): 37.1 (12 Oct 2021 13:14), Max: 37.1 (12 Oct 2021 13:14)  T(F): 98.7 (12 Oct 2021 13:14), Max: 98.7 (12 Oct 2021 13:14)  HR: 80 (12 Oct 2021 13:14) (77 - 86)  BP: 118/62 (12 Oct 2021 13:14) (109/65 - 154/65)  BP(mean): --  RR: 18 (12 Oct 2021 13:14) (18 - 18)  SpO2: 90% (12 Oct 2021 13:14) (90% - 91%)  GEN: NAD, obese   HEENT: normocephalic and atraumatic. EOMI. PERRL.    NECK: Supple.  No lymphadenopathy   LUNGS: scattered rhonchi   HEART: Regular rate and rhythm   ABDOMEN: Soft, nontender, and nondistended.  Positive bowel sounds.    : No CVA tenderness  EXTREMITIES: Without any cyanosis, clubbing, rash, lesions or edema.  NEUROLOGIC: grossly intact.  PSYCHIATRIC: confused   SKIN: No ulceration or induration present.    Labs:                        13.6   9.07  )-----------( 246      ( 12 Oct 2021 07:09 )             41.0     10-12    141  |  106  |  38<H>  ----------------------------<  172<H>  3.3<L>   |  30  |  0.80    Ca    8.3<L>      12 Oct 2021 07:09  Phos  2.8     10-12  Mg     2.6     10-12    TPro  6.9  /  Alb  2.7<L>  /  TBili  0.5  /  DBili  .20  /  AST  35  /  ALT  43  /  AlkPhos  158<H>  10-12    Culture - Blood (collected 10-05-21 @ 17:28)  Source: .Blood Blood-Peripheral  Final Report (10-10-21 @ 18:01):    No Growth Final    Culture - Blood (collected 10-05-21 @ 17:28)  Source: .Blood Blood-Peripheral  Final Report (10-10-21 @ 18:01):    No Growth Final    Culture - Urine (collected 10-04-21 @ 22:01)  Source: Clean Catch Clean Catch (Midstream)  Final Report (10-06-21 @ 12:25):    <10,000 CFU/mL Normal Urogenital Monisha    WBC Count: 9.07 K/uL (10-12-21 @ 07:09)  WBC Count: 11.91 K/uL (10-11-21 @ 07:43)  WBC Count: 8.90 K/uL (10-10-21 @ 09:25)  WBC Count: 8.61 K/uL (10-08-21 @ 09:43)    Creatinine, Serum: 0.80 mg/dL (10-12-21 @ 07:09)  Creatinine, Serum: 1.10 mg/dL (10-11-21 @ 07:43)  Creatinine, Serum: 0.84 mg/dL (10-10-21 @ 09:25)  Creatinine, Serum: 1.00 mg/dL (10-08-21 @ 09:43)    C-Reactive Protein, Serum: 13 mg/L (10-05-21 @ 17:31)    Ferritin, Serum: 97 ng/mL (10-05-21 @ 20:16)    Procalcitonin, Serum: <0.05 (10-05-21 @ 13:22)     COVID-19 PCR: Detected (10-05-21 @ 13:16)  COVID-19 PCR: Detected (10-04-21 @ 12:44)    All imaging and other data have been reviewed.  < from: Xray Chest 1 View- PORTABLE-Urgent (10.04.21 @ 12:41) >  EXAM:  XR CHEST PORTABLE URGENT 1V                        PROCEDURE DATE:  10/04/2021    INTERPRETATION:  Weakness.  AP chest.  Heart magnified by projection. Calcified aortic arch.  streaky fibrotic/atelectatic changes left base withmild elevation left hemidiaphragm. No consolidation or effusion.  IMPRESSION: No active infiltrates.    Assessment and Plan:   89y/o man with PMH of CAD, and OA was admitted today with vomiting and SOB for two days. he also has generalized weakness, body aches and headache. He recently came back from [place] and states  that he had contact with Known COVID case in [place]. He was seen in ED on 10/4 and had a positive COVID test.  10/09: Mental status worsening, now has 1:1, O2 requirement is higher today, on Venti mask 15L.     COVID 19   - BMP, CBC w diff, NLR. Procalcitonin, Ferritin, CRP, LDH and D dimer for the start and periodically can be repeated.   - Cultures negative   - CXR with no opacities   - Completed Remdesivir total 5days on 10/10  - Continue Dexamethasone 6mg po daily for 10days  - Watch O2 sat closely and taper as tolerated.   - No antibiotics at this time.  - Anticoagulation as per protocol  - Can given one dose Tocilizumab if O2 requirement goes up and advances to HFNC, still on Venti mask/NRB    Will follow PRN, Please call with any question or changes.     Dr. Salas Hernandez   Infectious Diseases   Please call [phone] between 8am and 6pm, call [phone] after 6pm or weekends.

## 2021-10-12 NOTE — PROGRESS NOTE ADULT - TIME BILLING
Patient seen and examined at bedside. Chart, meds, labs, vitals reviewed. Plan d/w RN. Prognosis is guarded.

## 2021-10-12 NOTE — PROGRESS NOTE ADULT - PROBLEM SELECTOR PLAN 1
-Acute hypoxic respiratory failure 2/2 confirmed COVID-19 infection  - Supp O2 prn to maintain O2 sats >88%. Prone PRN; pt satting well on 50% venti mask, will attemt to wean back to O2 by NC  - On Decadron. S/p  Remdesvir  - Agitation/ Mental status change posisbly 2/2 steroids and encephalopathy. Mentals status improved and off 1:1 observation and wrist retrains. Monitor closely. Continue Seroquel 25mg at bedtime.   - Encourage oral hydration.  - monitor volume status closely, avoid aggressive hydration  - tylenol prn myalgias, fever; robitussin for cough  - Avoid nebulizers. continue with metered dose inhalers prn.  Pt's family brought in home breo-ellipta, verified with pharmacy  - QTc: 497ms  - daily labs: CBC with diff and CMP  - ferritin, crp, d-dimer, procal at admission then will repeat If clinically worsening every 48-72 hours.  - will initiate VTE ppx: lovenox 40mg BID

## 2021-10-12 NOTE — PROGRESS NOTE ADULT - PROBLEM SELECTOR PLAN 3
- Continue home Lipitor 20mg qd  - ASA 81mg qd Patient with hx of TIA s/p R endarterectomy (2011)  - Continue home Plavix 75mg qd, Lipitor 20mg qd  - On  ASA 81mg qd

## 2021-10-12 NOTE — PROGRESS NOTE ADULT - SUBJECTIVE AND OBJECTIVE BOX
Date/Time Patient Seen:  		  Referring MD:   Data Reviewed	       Patient is a 89y old  Male who presents with a chief complaint of COVID-19 (11 Oct 2021 17:24)      Subjective/HPI     PAST MEDICAL & SURGICAL HISTORY:  HLD (hyperlipidemia)    Osteoarthritis    CAD (coronary artery disease)    H/O rotator cuff surgery  R shoulder          Medication list         MEDICATIONS  (STANDING):  ALBUTerol    90 MICROgram(s) HFA Inhaler 2 Puff(s) Inhalation every 6 hours  aspirin  chewable 81 milliGRAM(s) Oral daily  atorvastatin 20 milliGRAM(s) Oral at bedtime  clopidogrel Tablet 75 milliGRAM(s) Oral daily  dexAMETHasone  Injectable 6 milliGRAM(s) IV Push daily  dextrose 5% + sodium chloride 0.9%. 1000 milliLiter(s) (50 mL/Hr) IV Continuous <Continuous>  enoxaparin Injectable 40 milliGRAM(s) SubCutaneous every 12 hours  fluticasone furoate/vilanterol 100-25 MICROgram(s) Inhaler 1 Puff(s) Inhalation daily  fluticasone propionate 50 MICROgram(s)/spray Nasal Spray 1 Spray(s) Both Nostrils two times a day  guaifenesin/dextromethorphan Oral Liquid 5 milliLiter(s) Oral every 6 hours  OLANZapine Injectable 5 milliGRAM(s) IntraMuscular once  QUEtiapine 25 milliGRAM(s) Oral at bedtime  sodium chloride 0.65% Nasal 1 Spray(s) Both Nostrils two times a day    MEDICATIONS  (PRN):  acetaminophen   Tablet .. 650 milliGRAM(s) Oral every 6 hours PRN Temp greater or equal to 38C (100.4F), Mild Pain (1 - 3)  melatonin 5 milliGRAM(s) Oral at bedtime PRN Insomnia         Vitals log        ICU Vital Signs Last 24 Hrs  T(C): 36.3 (12 Oct 2021 04:52), Max: 36.8 (11 Oct 2021 06:20)  T(F): 97.4 (12 Oct 2021 04:52), Max: 98.3 (11 Oct 2021 06:20)  HR: 81 (12 Oct 2021 04:52) (77 - 100)  BP: 136/73 (12 Oct 2021 04:52) (109/65 - 154/65)  BP(mean): --  ABP: --  ABP(mean): --  RR: 18 (12 Oct 2021 04:52) (18 - 19)  SpO2: 91% (12 Oct 2021 04:52) (90% - 93%)           Input and Output:  I&O's Detail    11 Oct 2021 07:01  -  12 Oct 2021 06:12  --------------------------------------------------------  IN:  Total IN: 0 mL    OUT:    Oral Fluid: 0 mL    Voided (mL): 0 mL  Total OUT: 0 mL    Total NET: 0 mL          Lab Data                        15.0   11.91 )-----------( 250      ( 11 Oct 2021 07:43 )             45.8     10-11    143  |  106  |  33<H>  ----------------------------<  178<H>  3.9   |  32<H>  |  1.10    Ca    9.0      11 Oct 2021 07:43    TPro  7.5  /  Alb  3.2<L>  /  TBili  0.6  /  DBili  .20  /  AST  61<H>  /  ALT  51  /  AlkPhos  175<H>  10-11            Review of Systems	      Objective     Physical Examination    heart s1s2  lung dec BS  on o2 support      Pertinent Lab findings & Imaging      Eliu:  NO   Adequate UO     I&O's Detail    11 Oct 2021 07:01  -  12 Oct 2021 06:12  --------------------------------------------------------  IN:  Total IN: 0 mL    OUT:    Oral Fluid: 0 mL    Voided (mL): 0 mL  Total OUT: 0 mL    Total NET: 0 mL               Discussed with:     Cultures:	        Radiology

## 2021-10-12 NOTE — DIETITIAN NUTRITION RISK NOTIFICATION - TREATMENT: THE FOLLOWING DIET HAS BEEN RECOMMENDED
Diet, DASH/TLC:   Sodium & Cholesterol Restricted  Supplement Feeding Modality:  Oral  Ensure Enlive Cans or Servings Per Day:  3       Frequency:  Three Times a day (10-09-21 @ 08:24) [Active]

## 2021-10-13 LAB
ALBUMIN SERPL ELPH-MCNC: 2.5 G/DL — LOW (ref 3.3–5)
ALBUMIN SERPL ELPH-MCNC: 2.6 G/DL — LOW (ref 3.3–5)
ALP SERPL-CCNC: 142 U/L — HIGH (ref 40–120)
ALP SERPL-CCNC: 147 U/L — HIGH (ref 40–120)
ALT FLD-CCNC: 48 U/L — SIGNIFICANT CHANGE UP (ref 12–78)
ALT FLD-CCNC: 50 U/L — SIGNIFICANT CHANGE UP (ref 12–78)
ANION GAP SERPL CALC-SCNC: 4 MMOL/L — LOW (ref 5–17)
AST SERPL-CCNC: 28 U/L — SIGNIFICANT CHANGE UP (ref 15–37)
AST SERPL-CCNC: 29 U/L — SIGNIFICANT CHANGE UP (ref 15–37)
BASOPHILS # BLD AUTO: 0.01 K/UL — SIGNIFICANT CHANGE UP (ref 0–0.2)
BASOPHILS NFR BLD AUTO: 0.1 % — SIGNIFICANT CHANGE UP (ref 0–2)
BILIRUB DIRECT SERPL-MCNC: 0.2 MG/DL — SIGNIFICANT CHANGE UP (ref 0.05–0.2)
BILIRUB INDIRECT FLD-MCNC: 0.2 MG/DL — SIGNIFICANT CHANGE UP (ref 0.2–1)
BILIRUB SERPL-MCNC: 0.4 MG/DL — SIGNIFICANT CHANGE UP (ref 0.2–1.2)
BILIRUB SERPL-MCNC: 0.4 MG/DL — SIGNIFICANT CHANGE UP (ref 0.2–1.2)
BUN SERPL-MCNC: 32 MG/DL — HIGH (ref 7–23)
CALCIUM SERPL-MCNC: 8.6 MG/DL — SIGNIFICANT CHANGE UP (ref 8.5–10.1)
CHLORIDE SERPL-SCNC: 109 MMOL/L — HIGH (ref 96–108)
CO2 SERPL-SCNC: 30 MMOL/L — SIGNIFICANT CHANGE UP (ref 22–31)
CREAT SERPL-MCNC: 0.77 MG/DL — SIGNIFICANT CHANGE UP (ref 0.5–1.3)
EOSINOPHIL # BLD AUTO: 0.01 K/UL — SIGNIFICANT CHANGE UP (ref 0–0.5)
EOSINOPHIL NFR BLD AUTO: 0.1 % — SIGNIFICANT CHANGE UP (ref 0–6)
GLUCOSE SERPL-MCNC: 117 MG/DL — HIGH (ref 70–99)
HCT VFR BLD CALC: 40.7 % — SIGNIFICANT CHANGE UP (ref 39–50)
HGB BLD-MCNC: 13.1 G/DL — SIGNIFICANT CHANGE UP (ref 13–17)
IMM GRANULOCYTES NFR BLD AUTO: 0.6 % — SIGNIFICANT CHANGE UP (ref 0–1.5)
LYMPHOCYTES # BLD AUTO: 1.23 K/UL — SIGNIFICANT CHANGE UP (ref 1–3.3)
LYMPHOCYTES # BLD AUTO: 15.2 % — SIGNIFICANT CHANGE UP (ref 13–44)
MAGNESIUM SERPL-MCNC: 2.7 MG/DL — HIGH (ref 1.6–2.6)
MCHC RBC-ENTMCNC: 29.6 PG — SIGNIFICANT CHANGE UP (ref 27–34)
MCHC RBC-ENTMCNC: 32.2 GM/DL — SIGNIFICANT CHANGE UP (ref 32–36)
MCV RBC AUTO: 91.9 FL — SIGNIFICANT CHANGE UP (ref 80–100)
MONOCYTES # BLD AUTO: 0.84 K/UL — SIGNIFICANT CHANGE UP (ref 0–0.9)
MONOCYTES NFR BLD AUTO: 10.4 % — SIGNIFICANT CHANGE UP (ref 2–14)
NEUTROPHILS # BLD AUTO: 5.97 K/UL — SIGNIFICANT CHANGE UP (ref 1.8–7.4)
NEUTROPHILS NFR BLD AUTO: 73.6 % — SIGNIFICANT CHANGE UP (ref 43–77)
NRBC # BLD: 0 /100 WBCS — SIGNIFICANT CHANGE UP (ref 0–0)
PHOSPHATE SERPL-MCNC: 3.2 MG/DL — SIGNIFICANT CHANGE UP (ref 2.5–4.5)
PLATELET # BLD AUTO: 238 K/UL — SIGNIFICANT CHANGE UP (ref 150–400)
POTASSIUM SERPL-MCNC: 4.3 MMOL/L — SIGNIFICANT CHANGE UP (ref 3.5–5.3)
POTASSIUM SERPL-SCNC: 4.3 MMOL/L — SIGNIFICANT CHANGE UP (ref 3.5–5.3)
PROT SERPL-MCNC: 6.8 G/DL — SIGNIFICANT CHANGE UP (ref 6–8.3)
PROT SERPL-MCNC: 6.9 G/DL — SIGNIFICANT CHANGE UP (ref 6–8.3)
RBC # BLD: 4.43 M/UL — SIGNIFICANT CHANGE UP (ref 4.2–5.8)
RBC # FLD: 13.7 % — SIGNIFICANT CHANGE UP (ref 10.3–14.5)
SODIUM SERPL-SCNC: 143 MMOL/L — SIGNIFICANT CHANGE UP (ref 135–145)
WBC # BLD: 8.11 K/UL — SIGNIFICANT CHANGE UP (ref 3.8–10.5)
WBC # FLD AUTO: 8.11 K/UL — SIGNIFICANT CHANGE UP (ref 3.8–10.5)

## 2021-10-13 PROCEDURE — 99233 SBSQ HOSP IP/OBS HIGH 50: CPT

## 2021-10-13 RX ADMIN — ALBUTEROL 2 PUFF(S): 90 AEROSOL, METERED ORAL at 21:32

## 2021-10-13 RX ADMIN — ALBUTEROL 2 PUFF(S): 90 AEROSOL, METERED ORAL at 06:01

## 2021-10-13 RX ADMIN — Medication 1 SPRAY(S): at 05:53

## 2021-10-13 RX ADMIN — Medication 5 MILLILITER(S): at 00:17

## 2021-10-13 RX ADMIN — Medication 5 MILLILITER(S): at 17:29

## 2021-10-13 RX ADMIN — ALBUTEROL 2 PUFF(S): 90 AEROSOL, METERED ORAL at 00:23

## 2021-10-13 RX ADMIN — Medication 1 SPRAY(S): at 17:29

## 2021-10-13 RX ADMIN — Medication 5 MILLILITER(S): at 11:27

## 2021-10-13 RX ADMIN — ATORVASTATIN CALCIUM 20 MILLIGRAM(S): 80 TABLET, FILM COATED ORAL at 21:32

## 2021-10-13 RX ADMIN — CLOPIDOGREL BISULFATE 75 MILLIGRAM(S): 75 TABLET, FILM COATED ORAL at 11:27

## 2021-10-13 RX ADMIN — Medication 6 MILLIGRAM(S): at 05:53

## 2021-10-13 RX ADMIN — QUETIAPINE FUMARATE 25 MILLIGRAM(S): 200 TABLET, FILM COATED ORAL at 21:32

## 2021-10-13 RX ADMIN — FLUTICASONE FUROATE AND VILANTEROL TRIFENATATE 1 PUFF(S): 100; 25 POWDER RESPIRATORY (INHALATION) at 06:02

## 2021-10-13 RX ADMIN — ENOXAPARIN SODIUM 40 MILLIGRAM(S): 100 INJECTION SUBCUTANEOUS at 17:29

## 2021-10-13 RX ADMIN — ENOXAPARIN SODIUM 40 MILLIGRAM(S): 100 INJECTION SUBCUTANEOUS at 05:53

## 2021-10-13 RX ADMIN — Medication 5 MILLILITER(S): at 05:53

## 2021-10-13 RX ADMIN — Medication 81 MILLIGRAM(S): at 11:27

## 2021-10-13 NOTE — PROGRESS NOTE ADULT - SUBJECTIVE AND OBJECTIVE BOX
Subjective: On Venti Mask.  Has no complaints but is in some discomfort visually.     MEDICATIONS  (STANDING):  ALBUTerol    90 MICROgram(s) HFA Inhaler 2 Puff(s) Inhalation every 6 hours  aspirin  chewable 81 milliGRAM(s) Oral daily  atorvastatin 20 milliGRAM(s) Oral at bedtime  clopidogrel Tablet 75 milliGRAM(s) Oral daily  dexAMETHasone  Injectable 6 milliGRAM(s) IV Push daily  enoxaparin Injectable 40 milliGRAM(s) SubCutaneous every 12 hours  fluticasone furoate/vilanterol 100-25 MICROgram(s) Inhaler 1 Puff(s) Inhalation daily  fluticasone propionate 50 MICROgram(s)/spray Nasal Spray 1 Spray(s) Both Nostrils two times a day  guaifenesin/dextromethorphan Oral Liquid 5 milliLiter(s) Oral every 6 hours  OLANZapine Injectable 5 milliGRAM(s) IntraMuscular once  QUEtiapine 25 milliGRAM(s) Oral at bedtime  sodium chloride 0.65% Nasal 1 Spray(s) Both Nostrils two times a day    MEDICATIONS  (PRN):  acetaminophen   Tablet .. 650 milliGRAM(s) Oral every 6 hours PRN Temp greater or equal to 38C (100.4F), Mild Pain (1 - 3)  melatonin 5 milliGRAM(s) Oral at bedtime PRN Insomnia      Allergies    No Known Allergies    Intolerances        Vital Signs Last 24 Hrs  T(C): 36.6 (13 Oct 2021 04:42), Max: 37.1 (12 Oct 2021 13:14)  T(F): 97.8 (13 Oct 2021 04:42), Max: 98.7 (12 Oct 2021 13:14)  HR: 73 (13 Oct 2021 04:42) (73 - 80)  BP: 120/74 (13 Oct 2021 04:42) (118/62 - 120/74)  BP(mean): --  RR: 18 (13 Oct 2021 04:42) (18 - 18)  SpO2: 94% (13 Oct 2021 04:42) (90% - 94%)    PHYSICAL EXAM:  GENERAL: NAD, well-groomed, well-developed  HEAD:  Atraumatic, Normocephalic  ENMT: Moist mucous membranes,   NECK: Supple, No JVD, Normal thyroid  CHEST/LUNG: Coarse Breath Sounds Bilaterally  HEART: Regular rate and rhythm; No murmurs, rubs, or gallops  ABDOMEN: Soft, Nontender, Nondistended; Bowel sounds present  EXTREMITIES:  2+ Peripheral Pulses, No clubbing, cyanosis, or edema      LABS:                        13.1   8.11  )-----------( 238      ( 13 Oct 2021 07:31 )             40.7     13 Oct 2021 07:31    143    |  109    |  32     ----------------------------<  117    4.3     |  30     |  0.77     Ca    8.6        13 Oct 2021 07:31  Phos  3.2       13 Oct 2021 07:31  Mg     2.7       13 Oct 2021 07:31    TPro  6.8    /  Alb  2.5    /  TBili  0.4    /  DBili  .20    /  AST  29     /  ALT  50     /  AlkPhos  142    13 Oct 2021 07:31        CAPILLARY BLOOD GLUCOSE          RADIOLOGY & ADDITIONAL TESTS:    Imaging Personally Reviewed:  [ ] YES     Consultant(s) Notes Reviewed:      Care Discussed with Consultants/Other Providers:    Advanced Directives: [ ] DNR  [ ] No feeding tube  [ ] MOLST in chart  [ ] MOLST completed today  [ ] Unknown

## 2021-10-13 NOTE — PROGRESS NOTE ADULT - PROBLEM SELECTOR PLAN 2
Patient with hx of TIA s/p R endarterectomy (2011)  - Continue home Plavix 75mg qd, Lipitor 20mg qd  - On  ASA 81mg qd

## 2021-10-13 NOTE — PROGRESS NOTE ADULT - PROBLEM SELECTOR PLAN 1
-Acute hypoxic respiratory failure 2/2 confirmed COVID-19 infection  - Supp O2 prn to maintain O2 sats >88%. Prone PRN; pt satting well on 50% venti mask, will attempt to wean back to O2 by NC  - On Decadron. S/p  Remdesvir  - Agitation/ Mental status change posisbly 2/2 steroids and encephalopathy. Mentals status improved and off 1:1 observation and wrist retrains. Monitor closely. Continue Seroquel 25mg at bedtime.   - Encourage oral hydration.  - monitor volume status closely, avoid aggressive hydration  - tylenol prn myalgias, fever; robitussin for cough  - Avoid nebulizers. continue with metered dose inhalers prn.  Pt's family brought in home breo-ellipta, verified with pharmacy  - QTc: 497ms  - daily labs: CBC with diff and CMP  - ferritin, crp, d-dimer, procal at admission then will repeat If clinically worsening every 48-72 hours.  - will initiate VTE ppx: lovenox 40mg BID

## 2021-10-14 LAB
ALBUMIN SERPL ELPH-MCNC: 2.5 G/DL — LOW (ref 3.3–5)
ALBUMIN SERPL ELPH-MCNC: 2.6 G/DL — LOW (ref 3.3–5)
ALP SERPL-CCNC: 142 U/L — HIGH (ref 40–120)
ALP SERPL-CCNC: 150 U/L — HIGH (ref 40–120)
ALT FLD-CCNC: 80 U/L — HIGH (ref 12–78)
ALT FLD-CCNC: 82 U/L — HIGH (ref 12–78)
ANION GAP SERPL CALC-SCNC: 3 MMOL/L — LOW (ref 5–17)
AST SERPL-CCNC: 42 U/L — HIGH (ref 15–37)
AST SERPL-CCNC: 43 U/L — HIGH (ref 15–37)
BILIRUB DIRECT SERPL-MCNC: 0.1 MG/DL — SIGNIFICANT CHANGE UP (ref 0.05–0.2)
BILIRUB INDIRECT FLD-MCNC: 0.4 MG/DL — SIGNIFICANT CHANGE UP (ref 0.2–1)
BILIRUB SERPL-MCNC: 0.5 MG/DL — SIGNIFICANT CHANGE UP (ref 0.2–1.2)
BILIRUB SERPL-MCNC: 0.5 MG/DL — SIGNIFICANT CHANGE UP (ref 0.2–1.2)
BUN SERPL-MCNC: 29 MG/DL — HIGH (ref 7–23)
CALCIUM SERPL-MCNC: 8.4 MG/DL — LOW (ref 8.5–10.1)
CHLORIDE SERPL-SCNC: 106 MMOL/L — SIGNIFICANT CHANGE UP (ref 96–108)
CO2 SERPL-SCNC: 31 MMOL/L — SIGNIFICANT CHANGE UP (ref 22–31)
CREAT SERPL-MCNC: 0.88 MG/DL — SIGNIFICANT CHANGE UP (ref 0.5–1.3)
GLUCOSE SERPL-MCNC: 161 MG/DL — HIGH (ref 70–99)
HCT VFR BLD CALC: 40.8 % — SIGNIFICANT CHANGE UP (ref 39–50)
HGB BLD-MCNC: 13.4 G/DL — SIGNIFICANT CHANGE UP (ref 13–17)
MCHC RBC-ENTMCNC: 29.8 PG — SIGNIFICANT CHANGE UP (ref 27–34)
MCHC RBC-ENTMCNC: 32.8 GM/DL — SIGNIFICANT CHANGE UP (ref 32–36)
MCV RBC AUTO: 90.9 FL — SIGNIFICANT CHANGE UP (ref 80–100)
NRBC # BLD: 0 /100 WBCS — SIGNIFICANT CHANGE UP (ref 0–0)
PLATELET # BLD AUTO: 244 K/UL — SIGNIFICANT CHANGE UP (ref 150–400)
POTASSIUM SERPL-MCNC: 4.2 MMOL/L — SIGNIFICANT CHANGE UP (ref 3.5–5.3)
POTASSIUM SERPL-SCNC: 4.2 MMOL/L — SIGNIFICANT CHANGE UP (ref 3.5–5.3)
PROT SERPL-MCNC: 6.8 G/DL — SIGNIFICANT CHANGE UP (ref 6–8.3)
PROT SERPL-MCNC: 6.9 G/DL — SIGNIFICANT CHANGE UP (ref 6–8.3)
RBC # BLD: 4.49 M/UL — SIGNIFICANT CHANGE UP (ref 4.2–5.8)
RBC # FLD: 13.5 % — SIGNIFICANT CHANGE UP (ref 10.3–14.5)
SODIUM SERPL-SCNC: 140 MMOL/L — SIGNIFICANT CHANGE UP (ref 135–145)
WBC # BLD: 11.27 K/UL — HIGH (ref 3.8–10.5)
WBC # FLD AUTO: 11.27 K/UL — HIGH (ref 3.8–10.5)

## 2021-10-14 PROCEDURE — 99233 SBSQ HOSP IP/OBS HIGH 50: CPT

## 2021-10-14 RX ADMIN — ALBUTEROL 2 PUFF(S): 90 AEROSOL, METERED ORAL at 06:30

## 2021-10-14 RX ADMIN — Medication 81 MILLIGRAM(S): at 11:40

## 2021-10-14 RX ADMIN — Medication 5 MILLIGRAM(S): at 21:10

## 2021-10-14 RX ADMIN — QUETIAPINE FUMARATE 25 MILLIGRAM(S): 200 TABLET, FILM COATED ORAL at 21:10

## 2021-10-14 RX ADMIN — Medication 5 MILLILITER(S): at 11:39

## 2021-10-14 RX ADMIN — Medication 1 SPRAY(S): at 05:22

## 2021-10-14 RX ADMIN — Medication 1 SPRAY(S): at 18:04

## 2021-10-14 RX ADMIN — ATORVASTATIN CALCIUM 20 MILLIGRAM(S): 80 TABLET, FILM COATED ORAL at 21:11

## 2021-10-14 RX ADMIN — ALBUTEROL 2 PUFF(S): 90 AEROSOL, METERED ORAL at 21:10

## 2021-10-14 RX ADMIN — CLOPIDOGREL BISULFATE 75 MILLIGRAM(S): 75 TABLET, FILM COATED ORAL at 11:39

## 2021-10-14 RX ADMIN — FLUTICASONE FUROATE AND VILANTEROL TRIFENATATE 1 PUFF(S): 100; 25 POWDER RESPIRATORY (INHALATION) at 06:30

## 2021-10-14 RX ADMIN — Medication 6 MILLIGRAM(S): at 05:22

## 2021-10-14 RX ADMIN — ALBUTEROL 2 PUFF(S): 90 AEROSOL, METERED ORAL at 15:10

## 2021-10-14 RX ADMIN — Medication 5 MILLILITER(S): at 05:22

## 2021-10-14 RX ADMIN — Medication 5 MILLILITER(S): at 18:04

## 2021-10-14 RX ADMIN — ENOXAPARIN SODIUM 40 MILLIGRAM(S): 100 INJECTION SUBCUTANEOUS at 05:22

## 2021-10-14 RX ADMIN — ENOXAPARIN SODIUM 40 MILLIGRAM(S): 100 INJECTION SUBCUTANEOUS at 18:04

## 2021-10-14 RX ADMIN — Medication 5 MILLILITER(S): at 00:08

## 2021-10-14 NOTE — PHYSICAL THERAPY INITIAL EVALUATION ADULT - PERTINENT HX OF CURRENT PROBLEM, REHAB EVAL
89y/o M admitted with vomiting, generalized weakness and SOB x2 days w/ body aches and headache. He recently came back from [place] and states close contact with Known COVID case. Pt w/ +COVID test. Cultures negative. CXR with no opacities. Completed Remdesivir total 5days on 10/10. Hospital course complicated by agitation and mental status change likely due to encephalopathy

## 2021-10-14 NOTE — PHYSICAL THERAPY INITIAL EVALUATION ADULT - ORIENTATION, REHAB EVAL
[FreeTextEntry1] :  Well exam for 21 year old WM  with PMH as stated in HPI / active list. \par \par Management : \par \par Baseline US of lIver and will fax to Derm\par \par See HPI and Plan\par \par Labs in office today.   Will advise. \par \par Best wishes offered !\par 
person/place/situation

## 2021-10-14 NOTE — PROGRESS NOTE ADULT - SUBJECTIVE AND OBJECTIVE BOX
Subjective: Patient seen and examined. No overnight events and no complaints.  Feeling very weak.     MEDICATIONS  (STANDING):  ALBUTerol    90 MICROgram(s) HFA Inhaler 2 Puff(s) Inhalation every 6 hours  aspirin  chewable 81 milliGRAM(s) Oral daily  atorvastatin 20 milliGRAM(s) Oral at bedtime  clopidogrel Tablet 75 milliGRAM(s) Oral daily  dexAMETHasone  Injectable 6 milliGRAM(s) IV Push daily  enoxaparin Injectable 40 milliGRAM(s) SubCutaneous every 12 hours  fluticasone furoate/vilanterol 100-25 MICROgram(s) Inhaler 1 Puff(s) Inhalation daily  fluticasone propionate 50 MICROgram(s)/spray Nasal Spray 1 Spray(s) Both Nostrils two times a day  guaifenesin/dextromethorphan Oral Liquid 5 milliLiter(s) Oral every 6 hours  OLANZapine Injectable 5 milliGRAM(s) IntraMuscular once  QUEtiapine 25 milliGRAM(s) Oral at bedtime  sodium chloride 0.65% Nasal 1 Spray(s) Both Nostrils two times a day    MEDICATIONS  (PRN):  acetaminophen   Tablet .. 650 milliGRAM(s) Oral every 6 hours PRN Temp greater or equal to 38C (100.4F), Mild Pain (1 - 3)  melatonin 5 milliGRAM(s) Oral at bedtime PRN Insomnia      Allergies    No Known Allergies    Intolerances        Vital Signs Last 24 Hrs  T(C): 36.6 (14 Oct 2021 04:08), Max: 36.9 (13 Oct 2021 19:55)  T(F): 97.9 (14 Oct 2021 04:08), Max: 98.4 (13 Oct 2021 19:55)  HR: 70 (14 Oct 2021 04:08) (70 - 82)  BP: 148/83 (14 Oct 2021 04:08) (111/70 - 148/83)  BP(mean): --  RR: 18 (14 Oct 2021 04:08) (18 - 19)  SpO2: 96% (14 Oct 2021 04:08) (90% - 96%)    PHYSICAL EXAM:  GENERAL: NAD, well-groomed, well-developed  HEAD:  Atraumatic, Normocephalic  ENMT: Moist mucous membranes,   NECK: Supple, No JVD, Normal thyroid   CHEST/LUNG: Coarse breath sounds bilaterally  HEART: Regular rate and rhythm; No murmurs, rubs, or gallops  ABDOMEN: Soft, Nontender, Nondistended; Bowel sounds present  EXTREMITIES:  2+ Peripheral Pulses, No clubbing, cyanosis, or edema      LABS:                        13.4   11.27 )-----------( 244      ( 14 Oct 2021 08:01 )             40.8     14 Oct 2021 08:01    140    |  106    |  29     ----------------------------<  161    4.2     |  31     |  0.88     Ca    8.4        14 Oct 2021 08:01    TPro  6.8    /  Alb  2.5    /  TBili  0.5    /  DBili  .10    /  AST  42     /  ALT  80     /  AlkPhos  142    14 Oct 2021 08:01        CAPILLARY BLOOD GLUCOSE          RADIOLOGY & ADDITIONAL TESTS:    Imaging Personally Reviewed:  [ ] YES     Consultant(s) Notes Reviewed:      Care Discussed with Consultants/Other Providers:    Advanced Directives: [ ] DNR  [ ] No feeding tube  [ ] MOLST in chart  [ ] MOLST completed today  [ ] Unknown

## 2021-10-15 DIAGNOSIS — Z02.9 ENCOUNTER FOR ADMINISTRATIVE EXAMINATIONS, UNSPECIFIED: ICD-10-CM

## 2021-10-15 LAB
ALBUMIN SERPL ELPH-MCNC: 2.6 G/DL — LOW (ref 3.3–5)
ALP SERPL-CCNC: 148 U/L — HIGH (ref 40–120)
ALT FLD-CCNC: 87 U/L — HIGH (ref 12–78)
ANION GAP SERPL CALC-SCNC: 6 MMOL/L — SIGNIFICANT CHANGE UP (ref 5–17)
AST SERPL-CCNC: 34 U/L — SIGNIFICANT CHANGE UP (ref 15–37)
BILIRUB SERPL-MCNC: 0.4 MG/DL — SIGNIFICANT CHANGE UP (ref 0.2–1.2)
BUN SERPL-MCNC: 26 MG/DL — HIGH (ref 7–23)
CALCIUM SERPL-MCNC: 8.6 MG/DL — SIGNIFICANT CHANGE UP (ref 8.5–10.1)
CHLORIDE SERPL-SCNC: 105 MMOL/L — SIGNIFICANT CHANGE UP (ref 96–108)
CO2 SERPL-SCNC: 29 MMOL/L — SIGNIFICANT CHANGE UP (ref 22–31)
CREAT SERPL-MCNC: 0.71 MG/DL — SIGNIFICANT CHANGE UP (ref 0.5–1.3)
GLUCOSE SERPL-MCNC: 160 MG/DL — HIGH (ref 70–99)
HCT VFR BLD CALC: 43.2 % — SIGNIFICANT CHANGE UP (ref 39–50)
HGB BLD-MCNC: 14 G/DL — SIGNIFICANT CHANGE UP (ref 13–17)
MCHC RBC-ENTMCNC: 29.1 PG — SIGNIFICANT CHANGE UP (ref 27–34)
MCHC RBC-ENTMCNC: 32.4 GM/DL — SIGNIFICANT CHANGE UP (ref 32–36)
MCV RBC AUTO: 89.8 FL — SIGNIFICANT CHANGE UP (ref 80–100)
NRBC # BLD: 0 /100 WBCS — SIGNIFICANT CHANGE UP (ref 0–0)
PLATELET # BLD AUTO: 248 K/UL — SIGNIFICANT CHANGE UP (ref 150–400)
POTASSIUM SERPL-MCNC: 4.4 MMOL/L — SIGNIFICANT CHANGE UP (ref 3.5–5.3)
POTASSIUM SERPL-SCNC: 4.4 MMOL/L — SIGNIFICANT CHANGE UP (ref 3.5–5.3)
PROT SERPL-MCNC: 7 G/DL — SIGNIFICANT CHANGE UP (ref 6–8.3)
RBC # BLD: 4.81 M/UL — SIGNIFICANT CHANGE UP (ref 4.2–5.8)
RBC # FLD: 13.5 % — SIGNIFICANT CHANGE UP (ref 10.3–14.5)
SODIUM SERPL-SCNC: 140 MMOL/L — SIGNIFICANT CHANGE UP (ref 135–145)
WBC # BLD: 11.16 K/UL — HIGH (ref 3.8–10.5)
WBC # FLD AUTO: 11.16 K/UL — HIGH (ref 3.8–10.5)

## 2021-10-15 PROCEDURE — 99233 SBSQ HOSP IP/OBS HIGH 50: CPT

## 2021-10-15 RX ADMIN — Medication 5 MILLILITER(S): at 00:10

## 2021-10-15 RX ADMIN — QUETIAPINE FUMARATE 25 MILLIGRAM(S): 200 TABLET, FILM COATED ORAL at 22:32

## 2021-10-15 RX ADMIN — Medication 5 MILLILITER(S): at 11:12

## 2021-10-15 RX ADMIN — Medication 1 SPRAY(S): at 17:25

## 2021-10-15 RX ADMIN — ENOXAPARIN SODIUM 40 MILLIGRAM(S): 100 INJECTION SUBCUTANEOUS at 05:22

## 2021-10-15 RX ADMIN — Medication 5 MILLILITER(S): at 05:22

## 2021-10-15 RX ADMIN — Medication 5 MILLILITER(S): at 17:25

## 2021-10-15 RX ADMIN — Medication 6 MILLIGRAM(S): at 05:22

## 2021-10-15 RX ADMIN — Medication 81 MILLIGRAM(S): at 11:12

## 2021-10-15 RX ADMIN — ENOXAPARIN SODIUM 40 MILLIGRAM(S): 100 INJECTION SUBCUTANEOUS at 17:25

## 2021-10-15 RX ADMIN — Medication 1 SPRAY(S): at 05:23

## 2021-10-15 RX ADMIN — ALBUTEROL 2 PUFF(S): 90 AEROSOL, METERED ORAL at 09:08

## 2021-10-15 RX ADMIN — FLUTICASONE FUROATE AND VILANTEROL TRIFENATATE 1 PUFF(S): 100; 25 POWDER RESPIRATORY (INHALATION) at 09:08

## 2021-10-15 RX ADMIN — ALBUTEROL 2 PUFF(S): 90 AEROSOL, METERED ORAL at 14:29

## 2021-10-15 RX ADMIN — Medication 5 MILLIGRAM(S): at 22:32

## 2021-10-15 RX ADMIN — ALBUTEROL 2 PUFF(S): 90 AEROSOL, METERED ORAL at 22:32

## 2021-10-15 RX ADMIN — CLOPIDOGREL BISULFATE 75 MILLIGRAM(S): 75 TABLET, FILM COATED ORAL at 11:12

## 2021-10-15 RX ADMIN — ALBUTEROL 2 PUFF(S): 90 AEROSOL, METERED ORAL at 02:00

## 2021-10-15 RX ADMIN — ATORVASTATIN CALCIUM 20 MILLIGRAM(S): 80 TABLET, FILM COATED ORAL at 22:32

## 2021-10-15 NOTE — CHART NOTE - NSCHARTNOTEFT_GEN_A_CORE
Assessment:   Pt seen for nutrition follow up.  Chart reviewed, hospital course noted.     Brief History:  Patient is a 89 y/o M with PMH of TIA s/p R endarterectomy (2011), hyperlipidemia, hiatal hernia, GERD admitted for COVID-19, hospital course complicated by agitation and mental status change likely due to encephalopathy.    Pt seen at bedside, alert, conversive.  Pt difficult to understand due to accent.   Per PCA, pt eats ~25-50% meals.  Pt expressed missing dentures, needs soft foods. c/o urinating in bed all the time as he cant get up himself and states he cant use a urinal himself ?   At least 10 bottles of Ensure observed on bedside trays and bedside table.  Pt not drinking .  Could not ascertain from conversation why he is not consuming.  Pt states he is eating OK.          Factors impacting intake: [ ] none [ ] nausea  [ ] vomiting [ ] diarrhea [ ] constipation  [x]chewing problems (dentures missing) [ ] swallowing issues  [x] other:  decreased appetite    Diet Prescription: Diet, DASH/TLC:   Sodium & Cholesterol Restricted  Supplement Feeding Modality:  Oral  Ensure Enlive Cans or Servings Per Day:  3       Frequency:  Three Times a day (10-09-21 @ 08:24)    Intake:  per PCA, 25-50%, per pt, eating fine    Current Weight: 10/6 224.8#, 10/11 212.3#, 10/13 217.3#, 10/15 225#      Pertinent Medications: MEDICATIONS  (STANDING):  ALBUTerol    90 MICROgram(s) HFA Inhaler 2 Puff(s) Inhalation every 6 hours  aspirin  chewable 81 milliGRAM(s) Oral daily  atorvastatin 20 milliGRAM(s) Oral at bedtime  clopidogrel Tablet 75 milliGRAM(s) Oral daily  enoxaparin Injectable 40 milliGRAM(s) SubCutaneous every 12 hours  fluticasone furoate/vilanterol 100-25 MICROgram(s) Inhaler 1 Puff(s) Inhalation daily  fluticasone propionate 50 MICROgram(s)/spray Nasal Spray 1 Spray(s) Both Nostrils two times a day  guaifenesin/dextromethorphan Oral Liquid 5 milliLiter(s) Oral every 6 hours  OLANZapine Injectable 5 milliGRAM(s) IntraMuscular once  QUEtiapine 25 milliGRAM(s) Oral at bedtime  sodium chloride 0.65% Nasal 1 Spray(s) Both Nostrils two times a day    MEDICATIONS  (PRN):  acetaminophen   Tablet .. 650 milliGRAM(s) Oral every 6 hours PRN Temp greater or equal to 38C (100.4F), Mild Pain (1 - 3)  melatonin 5 milliGRAM(s) Oral at bedtime PRN Insomnia    Pertinent Labs: 10-15 Na140 mmol/L Glu 160 mg/dL<H> K+ 4.4 mmol/L Cr  0.71 mg/dL BUN 26 mg/dL<H> 10-13 Phos 3.2 mg/dL 10-15 Alb 2.6 g/dL<L>      Skin:  no pressure injuries  Edema: 1+ feet/legs    Estimated Needs:   [x] no change since previous assessment on: 10/12: [id number] daniel, 68-82 gm protein  [ ] recalculated: based on          kcal/day:         gms protein/day:    Previous Nutrition Diagnosis:   [x]  Malnutrition     Nutrition Diagnosis is [x] ongoing  [ ] resolved [ ] not applicable     New Nutrition Diagnosis: [x] not applicable       Interventions:   Recommend  [x] Continue current diet, provide soft foods.   [ ] Change Diet To:  [ ] Nutrition Supplement  [ ] Nutrition Support  [x] Other:  d/c Ensure Enlive due to pt refusal.  Trial of Ensure Clear at lunch today.      Monitoring and Evaluation:   [x] PO intake [ x ] Tolerance to diet prescription [ x ] weights [ x ] labs [ x ] follow up per protocol  [x] other: s/s GI distress, bowel function, skin integrity/ edema

## 2021-10-15 NOTE — PROGRESS NOTE ADULT - SUBJECTIVE AND OBJECTIVE BOX
Subjective: Resting in bed with no overnight events.  On 6L of NC and feeling very weak and tired.     MEDICATIONS  (STANDING):  ALBUTerol    90 MICROgram(s) HFA Inhaler 2 Puff(s) Inhalation every 6 hours  aspirin  chewable 81 milliGRAM(s) Oral daily  atorvastatin 20 milliGRAM(s) Oral at bedtime  clopidogrel Tablet 75 milliGRAM(s) Oral daily  enoxaparin Injectable 40 milliGRAM(s) SubCutaneous every 12 hours  fluticasone furoate/vilanterol 100-25 MICROgram(s) Inhaler 1 Puff(s) Inhalation daily  fluticasone propionate 50 MICROgram(s)/spray Nasal Spray 1 Spray(s) Both Nostrils two times a day  guaifenesin/dextromethorphan Oral Liquid 5 milliLiter(s) Oral every 6 hours  OLANZapine Injectable 5 milliGRAM(s) IntraMuscular once  QUEtiapine 25 milliGRAM(s) Oral at bedtime  sodium chloride 0.65% Nasal 1 Spray(s) Both Nostrils two times a day    MEDICATIONS  (PRN):  acetaminophen   Tablet .. 650 milliGRAM(s) Oral every 6 hours PRN Temp greater or equal to 38C (100.4F), Mild Pain (1 - 3)  melatonin 5 milliGRAM(s) Oral at bedtime PRN Insomnia      Allergies    No Known Allergies    Intolerances        Vital Signs Last 24 Hrs  T(C): 36.4 (15 Oct 2021 04:40), Max: 36.7 (14 Oct 2021 20:22)  T(F): 97.5 (15 Oct 2021 04:40), Max: 98 (14 Oct 2021 20:22)  HR: 63 (15 Oct 2021 04:40) (63 - 68)  BP: 134/73 (15 Oct 2021 04:40) (134/73 - 162/70)  BP(mean): --  RR: 18 (15 Oct 2021 04:40) (18 - 18)  SpO2: 93% (15 Oct 2021 04:40) (93% - 94%)    PHYSICAL EXAM:  GENERAL: NAD, well-groomed, well-developed  HEAD:  Atraumatic, Normocephalic  ENMT: Moist mucous membranes,   NECK: Supple, No JVD, Normal thyroid  CHEST/LUNG: Coarse BS B/L   HEART: Regular rate and rhythm; No murmurs, rubs, or gallops  ABDOMEN: Soft, Nontender, Nondistended; Bowel sounds present  EXTREMITIES:  2+ Peripheral Pulses, No clubbing, cyanosis, or edema      LABS:                        14.0   11.16 )-----------( 248      ( 15 Oct 2021 08:53 )             43.2     15 Oct 2021 08:53    140    |  105    |  26     ----------------------------<  160    4.4     |  29     |  0.71     Ca    8.6        15 Oct 2021 08:53    TPro  7.0    /  Alb  2.6    /  TBili  0.4    /  DBili  x      /  AST  34     /  ALT  87     /  AlkPhos  148    15 Oct 2021 08:53        CAPILLARY BLOOD GLUCOSE          RADIOLOGY & ADDITIONAL TESTS:    Imaging Personally Reviewed:  [ ] YES     Consultant(s) Notes Reviewed:      Care Discussed with Consultants/Other Providers:    Advanced Directives: [ ] DNR  [ ] No feeding tube  [ ] MOLST in chart  [ ] MOLST completed today  [ ] Unknown

## 2021-10-15 NOTE — PROGRESS NOTE ADULT - PROBLEM SELECTOR PLAN 1
-Acute hypoxic respiratory failure 2/2 confirmed COVID-19 infection  - Supp O2 prn to maintain O2 sats >88%. Prone PRN; patient has been weaned down to NC 6L today  - Last Day of Decadron. S/p  Remdesvir  - Agitation/ Mental status change posisbly 2/2 steroids and encephalopathy. Mentals status improved and off 1:1 observation and wrist retrains. Monitor closely. Continue Seroquel 25mg at bedtime.   - Encourage oral hydration.  - Avoid nebulizers. continue with metered dose inhalers prn.  Pt's family brought in home breo-ellipta,  - VTE ppx: lovenox 40mg BID

## 2021-10-16 LAB
ALBUMIN SERPL ELPH-MCNC: 2.6 G/DL — LOW (ref 3.3–5)
ALP SERPL-CCNC: 147 U/L — HIGH (ref 40–120)
ALT FLD-CCNC: 84 U/L — HIGH (ref 12–78)
ANION GAP SERPL CALC-SCNC: 5 MMOL/L — SIGNIFICANT CHANGE UP (ref 5–17)
AST SERPL-CCNC: 24 U/L — SIGNIFICANT CHANGE UP (ref 15–37)
BILIRUB SERPL-MCNC: 0.4 MG/DL — SIGNIFICANT CHANGE UP (ref 0.2–1.2)
BUN SERPL-MCNC: 31 MG/DL — HIGH (ref 7–23)
CALCIUM SERPL-MCNC: 8.5 MG/DL — SIGNIFICANT CHANGE UP (ref 8.5–10.1)
CHLORIDE SERPL-SCNC: 103 MMOL/L — SIGNIFICANT CHANGE UP (ref 96–108)
CO2 SERPL-SCNC: 31 MMOL/L — SIGNIFICANT CHANGE UP (ref 22–31)
CREAT SERPL-MCNC: 0.92 MG/DL — SIGNIFICANT CHANGE UP (ref 0.5–1.3)
GLUCOSE SERPL-MCNC: 113 MG/DL — HIGH (ref 70–99)
HCT VFR BLD CALC: 44.8 % — SIGNIFICANT CHANGE UP (ref 39–50)
HGB BLD-MCNC: 14.4 G/DL — SIGNIFICANT CHANGE UP (ref 13–17)
MCHC RBC-ENTMCNC: 29.2 PG — SIGNIFICANT CHANGE UP (ref 27–34)
MCHC RBC-ENTMCNC: 32.1 GM/DL — SIGNIFICANT CHANGE UP (ref 32–36)
MCV RBC AUTO: 90.9 FL — SIGNIFICANT CHANGE UP (ref 80–100)
NRBC # BLD: 0 /100 WBCS — SIGNIFICANT CHANGE UP (ref 0–0)
PLATELET # BLD AUTO: 272 K/UL — SIGNIFICANT CHANGE UP (ref 150–400)
POTASSIUM SERPL-MCNC: 3.5 MMOL/L — SIGNIFICANT CHANGE UP (ref 3.5–5.3)
POTASSIUM SERPL-SCNC: 3.5 MMOL/L — SIGNIFICANT CHANGE UP (ref 3.5–5.3)
PROT SERPL-MCNC: 7 G/DL — SIGNIFICANT CHANGE UP (ref 6–8.3)
RBC # BLD: 4.93 M/UL — SIGNIFICANT CHANGE UP (ref 4.2–5.8)
RBC # FLD: 13.6 % — SIGNIFICANT CHANGE UP (ref 10.3–14.5)
SODIUM SERPL-SCNC: 139 MMOL/L — SIGNIFICANT CHANGE UP (ref 135–145)
WBC # BLD: 14.02 K/UL — HIGH (ref 3.8–10.5)
WBC # FLD AUTO: 14.02 K/UL — HIGH (ref 3.8–10.5)

## 2021-10-16 PROCEDURE — 99233 SBSQ HOSP IP/OBS HIGH 50: CPT

## 2021-10-16 RX ADMIN — ENOXAPARIN SODIUM 40 MILLIGRAM(S): 100 INJECTION SUBCUTANEOUS at 17:56

## 2021-10-16 RX ADMIN — Medication 1 SPRAY(S): at 05:08

## 2021-10-16 RX ADMIN — ALBUTEROL 2 PUFF(S): 90 AEROSOL, METERED ORAL at 12:32

## 2021-10-16 RX ADMIN — ALBUTEROL 2 PUFF(S): 90 AEROSOL, METERED ORAL at 22:50

## 2021-10-16 RX ADMIN — Medication 5 MILLILITER(S): at 05:08

## 2021-10-16 RX ADMIN — Medication 1 SPRAY(S): at 17:55

## 2021-10-16 RX ADMIN — Medication 5 MILLILITER(S): at 17:56

## 2021-10-16 RX ADMIN — ALBUTEROL 2 PUFF(S): 90 AEROSOL, METERED ORAL at 17:55

## 2021-10-16 RX ADMIN — Medication 1 SPRAY(S): at 05:07

## 2021-10-16 RX ADMIN — ALBUTEROL 2 PUFF(S): 90 AEROSOL, METERED ORAL at 05:08

## 2021-10-16 RX ADMIN — Medication 5 MILLILITER(S): at 12:35

## 2021-10-16 RX ADMIN — FLUTICASONE FUROATE AND VILANTEROL TRIFENATATE 1 PUFF(S): 100; 25 POWDER RESPIRATORY (INHALATION) at 10:02

## 2021-10-16 RX ADMIN — ATORVASTATIN CALCIUM 20 MILLIGRAM(S): 80 TABLET, FILM COATED ORAL at 22:49

## 2021-10-16 RX ADMIN — QUETIAPINE FUMARATE 25 MILLIGRAM(S): 200 TABLET, FILM COATED ORAL at 22:49

## 2021-10-16 RX ADMIN — Medication 5 MILLILITER(S): at 23:03

## 2021-10-16 RX ADMIN — CLOPIDOGREL BISULFATE 75 MILLIGRAM(S): 75 TABLET, FILM COATED ORAL at 12:32

## 2021-10-16 RX ADMIN — ENOXAPARIN SODIUM 40 MILLIGRAM(S): 100 INJECTION SUBCUTANEOUS at 05:07

## 2021-10-16 RX ADMIN — Medication 1 SPRAY(S): at 17:56

## 2021-10-16 RX ADMIN — Medication 81 MILLIGRAM(S): at 12:32

## 2021-10-16 RX ADMIN — Medication 5 MILLILITER(S): at 00:36

## 2021-10-16 NOTE — PROGRESS NOTE ADULT - SUBJECTIVE AND OBJECTIVE BOX
INTERVAL HPI/OVERNIGHT EVENTS:  Patient seen and examined at bedside. Patient states he feels a bit better today. States he feels his breathing is slightly improved. Patient states he still is coughing a lot with sputum production. Denies any chest pain, abd pain.    MEDICATIONS  (STANDING):  ALBUTerol    90 MICROgram(s) HFA Inhaler 2 Puff(s) Inhalation every 6 hours  aspirin  chewable 81 milliGRAM(s) Oral daily  atorvastatin 20 milliGRAM(s) Oral at bedtime  clopidogrel Tablet 75 milliGRAM(s) Oral daily  enoxaparin Injectable 40 milliGRAM(s) SubCutaneous every 12 hours  fluticasone furoate/vilanterol 100-25 MICROgram(s) Inhaler 1 Puff(s) Inhalation daily  fluticasone propionate 50 MICROgram(s)/spray Nasal Spray 1 Spray(s) Both Nostrils two times a day  guaifenesin/dextromethorphan Oral Liquid 5 milliLiter(s) Oral every 6 hours  OLANZapine Injectable 5 milliGRAM(s) IntraMuscular once  QUEtiapine 25 milliGRAM(s) Oral at bedtime  sodium chloride 0.65% Nasal 1 Spray(s) Both Nostrils two times a day    MEDICATIONS  (PRN):  acetaminophen   Tablet .. 650 milliGRAM(s) Oral every 6 hours PRN Temp greater or equal to 38C (100.4F), Mild Pain (1 - 3)  melatonin 5 milliGRAM(s) Oral at bedtime PRN Insomnia      Allergies    No Known Allergies    Intolerances      ROS:  CONSTITUTIONAL: + generalized weakness, no fevers or chills  EYES/ENT: No visual changes;  No vertigo or throat pain   NECK: No pain or stiffness  RESPIRATORY: + cough, no wheezing, hemoptysis; + shortness of breath on exertion  CARDIOVASCULAR: No chest pain or palpitations  GASTROINTESTINAL: No abdominal or epigastric pain. No nausea, vomiting, or hematemesis  GENITOURINARY: No dysuria, frequency or hematuria  NEUROLOGICAL: No numbness or weakness  All other review of systems is negative unless indicated above.    Vital Signs Last 24 Hrs  T(C): 36.6 (16 Oct 2021 12:37), Max: 36.6 (16 Oct 2021 12:37)  T(F): 97.9 (16 Oct 2021 12:37), Max: 97.9 (16 Oct 2021 12:37)  HR: 80 (16 Oct 2021 12:37) (72 - 80)  BP: 124/78 (16 Oct 2021 12:37) (124/78 - 133/72)  BP(mean): --  RR: 20 (16 Oct 2021 12:37) (19 - 20)  SpO2: 92% (16 Oct 2021 12:37) (90% - 93%)    10-15 @ 07:01  -  10-16 @ 07:00  --------------------------------------------------------  IN: 480 mL / OUT: 0 mL / NET: 480 mL    10-16 @ 07:01  -  10-16 @ 18:34  --------------------------------------------------------  IN: 360 mL / OUT: 0 mL / NET: 360 mL      Physical Exam:  General: NAD  Neurology: A&Ox3, nonfocal, NINA x 4  Respiratory: dec breath sounds B/L lung fields  CV: RRR, S1S2  Abdominal: Soft, NT, ND +BS  Extremities: No edema, + peripheral pulses      LABS:                        14.4   14.02 )-----------( 272      ( 16 Oct 2021 08:42 )             44.8     10-16    139  |  103  |  31<H>  ----------------------------<  113<H>  3.5   |  31  |  0.92    Ca    8.5      16 Oct 2021 08:42    TPro  7.0  /  Alb  2.6<L>  /  TBili  0.4  /  DBili  x   /  AST  24  /  ALT  84<H>  /  AlkPhos  147<H>  10-16          RADIOLOGY & ADDITIONAL TESTS:

## 2021-10-16 NOTE — PROGRESS NOTE ADULT - PROBLEM SELECTOR PLAN 1
-Acute hypoxic respiratory failure 2/2 confirmed COVID-19 infection  - Supp O2 prn to maintain O2 sats >88%. Prone PRN; patient has been weaned down to NC 6L today  - S/p  Remdesvir and steroids  - Agitation/ Mental status change possibly 2/2 steroids and encephalopathy. Mentals status improved and off 1:1 observation and wrist retrains. Monitor closely. Continue Seroquel 25mg at bedtime.   - Encourage oral hydration.  - Avoid nebulizers. continue with metered dose inhalers prn.  Pt's family brought in home breo-ellipta  - VTE ppx: lovenox 40mg BID

## 2021-10-17 LAB
ALBUMIN SERPL ELPH-MCNC: 2.5 G/DL — LOW (ref 3.3–5)
ALP SERPL-CCNC: 149 U/L — HIGH (ref 40–120)
ALT FLD-CCNC: 70 U/L — SIGNIFICANT CHANGE UP (ref 12–78)
ANION GAP SERPL CALC-SCNC: 6 MMOL/L — SIGNIFICANT CHANGE UP (ref 5–17)
AST SERPL-CCNC: 23 U/L — SIGNIFICANT CHANGE UP (ref 15–37)
BASOPHILS # BLD AUTO: 0.02 K/UL — SIGNIFICANT CHANGE UP (ref 0–0.2)
BASOPHILS NFR BLD AUTO: 0.2 % — SIGNIFICANT CHANGE UP (ref 0–2)
BILIRUB SERPL-MCNC: 0.5 MG/DL — SIGNIFICANT CHANGE UP (ref 0.2–1.2)
BUN SERPL-MCNC: 22 MG/DL — SIGNIFICANT CHANGE UP (ref 7–23)
CALCIUM SERPL-MCNC: 8.7 MG/DL — SIGNIFICANT CHANGE UP (ref 8.5–10.1)
CHLORIDE SERPL-SCNC: 106 MMOL/L — SIGNIFICANT CHANGE UP (ref 96–108)
CO2 SERPL-SCNC: 28 MMOL/L — SIGNIFICANT CHANGE UP (ref 22–31)
CREAT SERPL-MCNC: 0.79 MG/DL — SIGNIFICANT CHANGE UP (ref 0.5–1.3)
CRP SERPL-MCNC: 25 MG/L — HIGH
D DIMER BLD IA.RAPID-MCNC: 268 NG/ML DDU — HIGH
EOSINOPHIL # BLD AUTO: 0.11 K/UL — SIGNIFICANT CHANGE UP (ref 0–0.5)
EOSINOPHIL NFR BLD AUTO: 1 % — SIGNIFICANT CHANGE UP (ref 0–6)
FERRITIN SERPL-MCNC: 170 NG/ML — SIGNIFICANT CHANGE UP (ref 30–400)
GLUCOSE SERPL-MCNC: 112 MG/DL — HIGH (ref 70–99)
HCT VFR BLD CALC: 42.6 % — SIGNIFICANT CHANGE UP (ref 39–50)
HGB BLD-MCNC: 14.3 G/DL — SIGNIFICANT CHANGE UP (ref 13–17)
IMM GRANULOCYTES NFR BLD AUTO: 1.1 % — SIGNIFICANT CHANGE UP (ref 0–1.5)
LYMPHOCYTES # BLD AUTO: 19.6 % — SIGNIFICANT CHANGE UP (ref 13–44)
LYMPHOCYTES # BLD AUTO: 2.09 K/UL — SIGNIFICANT CHANGE UP (ref 1–3.3)
MCHC RBC-ENTMCNC: 29.7 PG — SIGNIFICANT CHANGE UP (ref 27–34)
MCHC RBC-ENTMCNC: 33.6 GM/DL — SIGNIFICANT CHANGE UP (ref 32–36)
MCV RBC AUTO: 88.4 FL — SIGNIFICANT CHANGE UP (ref 80–100)
MONOCYTES # BLD AUTO: 0.83 K/UL — SIGNIFICANT CHANGE UP (ref 0–0.9)
MONOCYTES NFR BLD AUTO: 7.8 % — SIGNIFICANT CHANGE UP (ref 2–14)
NEUTROPHILS # BLD AUTO: 7.47 K/UL — HIGH (ref 1.8–7.4)
NEUTROPHILS NFR BLD AUTO: 70.3 % — SIGNIFICANT CHANGE UP (ref 43–77)
NRBC # BLD: 0 /100 WBCS — SIGNIFICANT CHANGE UP (ref 0–0)
PLATELET # BLD AUTO: 231 K/UL — SIGNIFICANT CHANGE UP (ref 150–400)
POTASSIUM SERPL-MCNC: 3.8 MMOL/L — SIGNIFICANT CHANGE UP (ref 3.5–5.3)
POTASSIUM SERPL-SCNC: 3.8 MMOL/L — SIGNIFICANT CHANGE UP (ref 3.5–5.3)
PROT SERPL-MCNC: 6.9 G/DL — SIGNIFICANT CHANGE UP (ref 6–8.3)
RBC # BLD: 4.82 M/UL — SIGNIFICANT CHANGE UP (ref 4.2–5.8)
RBC # FLD: 13.3 % — SIGNIFICANT CHANGE UP (ref 10.3–14.5)
SODIUM SERPL-SCNC: 140 MMOL/L — SIGNIFICANT CHANGE UP (ref 135–145)
WBC # BLD: 10.64 K/UL — HIGH (ref 3.8–10.5)
WBC # FLD AUTO: 10.64 K/UL — HIGH (ref 3.8–10.5)

## 2021-10-17 PROCEDURE — 99232 SBSQ HOSP IP/OBS MODERATE 35: CPT

## 2021-10-17 RX ADMIN — Medication 1 SPRAY(S): at 06:15

## 2021-10-17 RX ADMIN — FLUTICASONE FUROATE AND VILANTEROL TRIFENATATE 1 PUFF(S): 100; 25 POWDER RESPIRATORY (INHALATION) at 06:44

## 2021-10-17 RX ADMIN — Medication 81 MILLIGRAM(S): at 11:02

## 2021-10-17 RX ADMIN — QUETIAPINE FUMARATE 25 MILLIGRAM(S): 200 TABLET, FILM COATED ORAL at 21:15

## 2021-10-17 RX ADMIN — ALBUTEROL 2 PUFF(S): 90 AEROSOL, METERED ORAL at 14:07

## 2021-10-17 RX ADMIN — ENOXAPARIN SODIUM 40 MILLIGRAM(S): 100 INJECTION SUBCUTANEOUS at 17:11

## 2021-10-17 RX ADMIN — ALBUTEROL 2 PUFF(S): 90 AEROSOL, METERED ORAL at 06:43

## 2021-10-17 RX ADMIN — CLOPIDOGREL BISULFATE 75 MILLIGRAM(S): 75 TABLET, FILM COATED ORAL at 11:02

## 2021-10-17 RX ADMIN — Medication 5 MILLILITER(S): at 11:02

## 2021-10-17 RX ADMIN — ATORVASTATIN CALCIUM 20 MILLIGRAM(S): 80 TABLET, FILM COATED ORAL at 21:15

## 2021-10-17 RX ADMIN — ENOXAPARIN SODIUM 40 MILLIGRAM(S): 100 INJECTION SUBCUTANEOUS at 06:14

## 2021-10-17 RX ADMIN — ALBUTEROL 2 PUFF(S): 90 AEROSOL, METERED ORAL at 21:13

## 2021-10-17 RX ADMIN — Medication 5 MILLIGRAM(S): at 21:15

## 2021-10-17 RX ADMIN — Medication 1 SPRAY(S): at 06:14

## 2021-10-17 RX ADMIN — Medication 5 MILLILITER(S): at 06:14

## 2021-10-17 NOTE — PROGRESS NOTE ADULT - PROBLEM SELECTOR PLAN 1
-Acute hypoxic respiratory failure 2/2 confirmed COVID-19 infection  - Supp O2 prn to maintain O2 sats >88%. Prone PRN; patient has been weaned down to NC 4L today  - S/p  Remdesvir and steroids  - Agitation/ Mental status change possibly 2/2 steroids and encephalopathy. Mentals status improved and off 1:1 observation and wrist retrains. Monitor closely. Continue Seroquel 25mg at bedtime.   - Encourage oral hydration.  - Avoid nebulizers. continue with metered dose inhalers prn.  Pt's family brought in home breo-ellipta  - VTE ppx: lovenox 40mg BID

## 2021-10-17 NOTE — PROGRESS NOTE ADULT - SUBJECTIVE AND OBJECTIVE BOX
INTERVAL HPI/OVERNIGHT EVENTS: Patient seen and examined at bedside. Patient states he is feeling a bit better. Still with sputum producing cough. Appears a bit more tired today, saturating in low 90s on 4L NC.    MEDICATIONS  (STANDING):  ALBUTerol    90 MICROgram(s) HFA Inhaler 2 Puff(s) Inhalation every 6 hours  aspirin  chewable 81 milliGRAM(s) Oral daily  atorvastatin 20 milliGRAM(s) Oral at bedtime  clopidogrel Tablet 75 milliGRAM(s) Oral daily  enoxaparin Injectable 40 milliGRAM(s) SubCutaneous every 12 hours  fluticasone furoate/vilanterol 100-25 MICROgram(s) Inhaler 1 Puff(s) Inhalation daily  fluticasone propionate 50 MICROgram(s)/spray Nasal Spray 1 Spray(s) Both Nostrils two times a day  guaifenesin/dextromethorphan Oral Liquid 5 milliLiter(s) Oral every 6 hours  OLANZapine Injectable 5 milliGRAM(s) IntraMuscular once  QUEtiapine 25 milliGRAM(s) Oral at bedtime  sodium chloride 0.65% Nasal 1 Spray(s) Both Nostrils two times a day    MEDICATIONS  (PRN):  acetaminophen   Tablet .. 650 milliGRAM(s) Oral every 6 hours PRN Temp greater or equal to 38C (100.4F), Mild Pain (1 - 3)  melatonin 5 milliGRAM(s) Oral at bedtime PRN Insomnia      Allergies    No Known Allergies    Intolerances        ROS:  CONSTITUTIONAL: + generalized weakness, no fevers or chills  EYES/ENT: No visual changes;  No vertigo or throat pain   NECK: No pain or stiffness  RESPIRATORY: + cough, no wheezing, hemoptysis; + shortness of breath on exertion  CARDIOVASCULAR: No chest pain or palpitations  GASTROINTESTINAL: No abdominal or epigastric pain. No nausea, vomiting, or hematemesis  GENITOURINARY: No dysuria, frequency or hematuria  NEUROLOGICAL: No numbness or weakness  All other review of systems is negative unless indicated above.      Vital Signs Last 24 Hrs  T(C): 37.4 (17 Oct 2021 20:52), Max: 37.4 (17 Oct 2021 20:52)  T(F): 99.3 (17 Oct 2021 20:52), Max: 99.3 (17 Oct 2021 20:52)  HR: 85 (17 Oct 2021 20:52) (77 - 93)  BP: 132/76 (17 Oct 2021 20:52) (122/70 - 132/76)  BP(mean): --  RR: 20 (17 Oct 2021 20:52) (18 - 20)  SpO2: 92% (17 Oct 2021 20:52) (90% - 92%)    10-16 @ 07:01  -  10-17 @ 07:00  --------------------------------------------------------  IN: 360 mL / OUT: 0 mL / NET: 360 mL    10-17 @ 07:01  -  10-17 @ 22:34  --------------------------------------------------------  IN: 260 mL / OUT: 0 mL / NET: 260 mL        Physical Exam:  General: NAD  Neurology: A&Ox3, nonfocal, NINA x 4  Respiratory: dec breath sounds B/L lung fields, +crackles at bases  CV: RRR, S1S2  Abdominal: Soft, NT, ND +BS  Extremities: No edema, + peripheral pulses      LABS:                        14.3   10.64 )-----------( 231      ( 17 Oct 2021 08:17 )             42.6     10-17    140  |  106  |  22  ----------------------------<  112<H>  3.8   |  28  |  0.79    Ca    8.7      17 Oct 2021 08:17    TPro  6.9  /  Alb  2.5<L>  /  TBili  0.5  /  DBili  x   /  AST  23  /  ALT  70  /  AlkPhos  149<H>  10-17          RADIOLOGY & ADDITIONAL TESTS:

## 2021-10-18 DIAGNOSIS — F32.89 OTHER SPECIFIED DEPRESSIVE EPISODES: ICD-10-CM

## 2021-10-18 LAB
ALBUMIN SERPL ELPH-MCNC: 2.3 G/DL — LOW (ref 3.3–5)
ALP SERPL-CCNC: 136 U/L — HIGH (ref 40–120)
ALT FLD-CCNC: 54 U/L — SIGNIFICANT CHANGE UP (ref 12–78)
ANION GAP SERPL CALC-SCNC: 7 MMOL/L — SIGNIFICANT CHANGE UP (ref 5–17)
AST SERPL-CCNC: 16 U/L — SIGNIFICANT CHANGE UP (ref 15–37)
BASOPHILS # BLD AUTO: 0.01 K/UL — SIGNIFICANT CHANGE UP (ref 0–0.2)
BASOPHILS NFR BLD AUTO: 0.1 % — SIGNIFICANT CHANGE UP (ref 0–2)
BILIRUB SERPL-MCNC: 0.5 MG/DL — SIGNIFICANT CHANGE UP (ref 0.2–1.2)
BUN SERPL-MCNC: 21 MG/DL — SIGNIFICANT CHANGE UP (ref 7–23)
CALCIUM SERPL-MCNC: 8.5 MG/DL — SIGNIFICANT CHANGE UP (ref 8.5–10.1)
CHLORIDE SERPL-SCNC: 104 MMOL/L — SIGNIFICANT CHANGE UP (ref 96–108)
CO2 SERPL-SCNC: 27 MMOL/L — SIGNIFICANT CHANGE UP (ref 22–31)
CREAT SERPL-MCNC: 0.79 MG/DL — SIGNIFICANT CHANGE UP (ref 0.5–1.3)
EOSINOPHIL # BLD AUTO: 0.12 K/UL — SIGNIFICANT CHANGE UP (ref 0–0.5)
EOSINOPHIL NFR BLD AUTO: 1.2 % — SIGNIFICANT CHANGE UP (ref 0–6)
GLUCOSE SERPL-MCNC: 138 MG/DL — HIGH (ref 70–99)
HCT VFR BLD CALC: 42.4 % — SIGNIFICANT CHANGE UP (ref 39–50)
HGB BLD-MCNC: 13.7 G/DL — SIGNIFICANT CHANGE UP (ref 13–17)
IMM GRANULOCYTES NFR BLD AUTO: 1 % — SIGNIFICANT CHANGE UP (ref 0–1.5)
LYMPHOCYTES # BLD AUTO: 1.57 K/UL — SIGNIFICANT CHANGE UP (ref 1–3.3)
LYMPHOCYTES # BLD AUTO: 15.1 % — SIGNIFICANT CHANGE UP (ref 13–44)
MCHC RBC-ENTMCNC: 29.1 PG — SIGNIFICANT CHANGE UP (ref 27–34)
MCHC RBC-ENTMCNC: 32.3 GM/DL — SIGNIFICANT CHANGE UP (ref 32–36)
MCV RBC AUTO: 90 FL — SIGNIFICANT CHANGE UP (ref 80–100)
MONOCYTES # BLD AUTO: 0.79 K/UL — SIGNIFICANT CHANGE UP (ref 0–0.9)
MONOCYTES NFR BLD AUTO: 7.6 % — SIGNIFICANT CHANGE UP (ref 2–14)
NEUTROPHILS # BLD AUTO: 7.83 K/UL — HIGH (ref 1.8–7.4)
NEUTROPHILS NFR BLD AUTO: 75 % — SIGNIFICANT CHANGE UP (ref 43–77)
NRBC # BLD: 0 /100 WBCS — SIGNIFICANT CHANGE UP (ref 0–0)
PLATELET # BLD AUTO: 239 K/UL — SIGNIFICANT CHANGE UP (ref 150–400)
POTASSIUM SERPL-MCNC: 3.8 MMOL/L — SIGNIFICANT CHANGE UP (ref 3.5–5.3)
POTASSIUM SERPL-SCNC: 3.8 MMOL/L — SIGNIFICANT CHANGE UP (ref 3.5–5.3)
PROT SERPL-MCNC: 6.7 G/DL — SIGNIFICANT CHANGE UP (ref 6–8.3)
RBC # BLD: 4.71 M/UL — SIGNIFICANT CHANGE UP (ref 4.2–5.8)
RBC # FLD: 13.5 % — SIGNIFICANT CHANGE UP (ref 10.3–14.5)
SARS-COV-2 RNA SPEC QL NAA+PROBE: DETECTED
SODIUM SERPL-SCNC: 138 MMOL/L — SIGNIFICANT CHANGE UP (ref 135–145)
WBC # BLD: 10.42 K/UL — SIGNIFICANT CHANGE UP (ref 3.8–10.5)
WBC # FLD AUTO: 10.42 K/UL — SIGNIFICANT CHANGE UP (ref 3.8–10.5)

## 2021-10-18 PROCEDURE — 99232 SBSQ HOSP IP/OBS MODERATE 35: CPT

## 2021-10-18 PROCEDURE — 99221 1ST HOSP IP/OBS SF/LOW 40: CPT

## 2021-10-18 RX ORDER — VENLAFAXINE HCL 75 MG
37.5 CAPSULE, EXT RELEASE 24 HR ORAL
Refills: 0 | Status: DISCONTINUED | OUTPATIENT
Start: 2021-10-18 | End: 2021-10-19

## 2021-10-18 RX ORDER — MIRTAZAPINE 45 MG/1
7.5 TABLET, ORALLY DISINTEGRATING ORAL AT BEDTIME
Refills: 0 | Status: DISCONTINUED | OUTPATIENT
Start: 2021-10-18 | End: 2021-10-19

## 2021-10-18 RX ADMIN — Medication 81 MILLIGRAM(S): at 12:32

## 2021-10-18 RX ADMIN — MIRTAZAPINE 7.5 MILLIGRAM(S): 45 TABLET, ORALLY DISINTEGRATING ORAL at 22:16

## 2021-10-18 RX ADMIN — ENOXAPARIN SODIUM 40 MILLIGRAM(S): 100 INJECTION SUBCUTANEOUS at 05:06

## 2021-10-18 RX ADMIN — FLUTICASONE FUROATE AND VILANTEROL TRIFENATATE 1 PUFF(S): 100; 25 POWDER RESPIRATORY (INHALATION) at 08:14

## 2021-10-18 RX ADMIN — ALBUTEROL 2 PUFF(S): 90 AEROSOL, METERED ORAL at 15:07

## 2021-10-18 RX ADMIN — ALBUTEROL 2 PUFF(S): 90 AEROSOL, METERED ORAL at 08:14

## 2021-10-18 RX ADMIN — Medication 1 SPRAY(S): at 17:56

## 2021-10-18 RX ADMIN — ALBUTEROL 2 PUFF(S): 90 AEROSOL, METERED ORAL at 02:00

## 2021-10-18 RX ADMIN — Medication 5 MILLILITER(S): at 17:59

## 2021-10-18 RX ADMIN — Medication 1 SPRAY(S): at 05:07

## 2021-10-18 RX ADMIN — Medication 5 MILLILITER(S): at 12:32

## 2021-10-18 RX ADMIN — QUETIAPINE FUMARATE 25 MILLIGRAM(S): 200 TABLET, FILM COATED ORAL at 22:16

## 2021-10-18 RX ADMIN — CLOPIDOGREL BISULFATE 75 MILLIGRAM(S): 75 TABLET, FILM COATED ORAL at 12:32

## 2021-10-18 RX ADMIN — Medication 5 MILLIGRAM(S): at 22:16

## 2021-10-18 RX ADMIN — ENOXAPARIN SODIUM 40 MILLIGRAM(S): 100 INJECTION SUBCUTANEOUS at 17:56

## 2021-10-18 RX ADMIN — Medication 5 MILLILITER(S): at 05:06

## 2021-10-18 RX ADMIN — Medication 37.5 MILLIGRAM(S): at 17:56

## 2021-10-18 RX ADMIN — ATORVASTATIN CALCIUM 20 MILLIGRAM(S): 80 TABLET, FILM COATED ORAL at 22:16

## 2021-10-18 RX ADMIN — Medication 1 SPRAY(S): at 05:06

## 2021-10-18 NOTE — PROGRESS NOTE ADULT - PROBLEM SELECTOR PLAN 5
Patient very weak and will need rehab on discharge once patients oxygenation comes down to NC 3-4L. D/C planning
Patient very weak and will need rehab on discharge once patients oxygenation comes down to NC 3-4L.
DVT Prophylaxis:  - Lovenox 40mg q12. Pending d-dimer level will consider increasing to therapeutic dosing of lovenox

## 2021-10-18 NOTE — BH CONSULTATION LIAISON ASSESSMENT NOTE - NSBHCHARTREVIEWLAB_PSY_A_CORE FT
13.7   10.42 )-----------( 239      ( 18 Oct 2021 07:32 )             42.4   10-18    138  |  104  |  21  ----------------------------<  138<H>  3.8   |  27  |  0.79    Ca    8.5      18 Oct 2021 07:32    TPro  6.7  /  Alb  2.3<L>  /  TBili  0.5  /  DBili  x   /  AST  16  /  ALT  54  /  AlkPhos  136<H>  10-18

## 2021-10-18 NOTE — PROGRESS NOTE ADULT - SUBJECTIVE AND OBJECTIVE BOX
Date/Time Patient Seen:  		  Referring MD:   Data Reviewed	       Patient is a 89y old  Male who presents with a chief complaint of COVID-19 (17 Oct 2021 17:34)      Subjective/HPI     PAST MEDICAL & SURGICAL HISTORY:  HLD (hyperlipidemia)    Osteoarthritis    CAD (coronary artery disease)    H/O rotator cuff surgery  R shoulder          Medication list         MEDICATIONS  (STANDING):  ALBUTerol    90 MICROgram(s) HFA Inhaler 2 Puff(s) Inhalation every 6 hours  aspirin  chewable 81 milliGRAM(s) Oral daily  atorvastatin 20 milliGRAM(s) Oral at bedtime  clopidogrel Tablet 75 milliGRAM(s) Oral daily  enoxaparin Injectable 40 milliGRAM(s) SubCutaneous every 12 hours  fluticasone furoate/vilanterol 100-25 MICROgram(s) Inhaler 1 Puff(s) Inhalation daily  fluticasone propionate 50 MICROgram(s)/spray Nasal Spray 1 Spray(s) Both Nostrils two times a day  guaifenesin/dextromethorphan Oral Liquid 5 milliLiter(s) Oral every 6 hours  OLANZapine Injectable 5 milliGRAM(s) IntraMuscular once  QUEtiapine 25 milliGRAM(s) Oral at bedtime  sodium chloride 0.65% Nasal 1 Spray(s) Both Nostrils two times a day    MEDICATIONS  (PRN):  acetaminophen   Tablet .. 650 milliGRAM(s) Oral every 6 hours PRN Temp greater or equal to 38C (100.4F), Mild Pain (1 - 3)  melatonin 5 milliGRAM(s) Oral at bedtime PRN Insomnia         Vitals log        ICU Vital Signs Last 24 Hrs  T(C): 36.8 (18 Oct 2021 04:22), Max: 37.4 (17 Oct 2021 20:52)  T(F): 98.3 (18 Oct 2021 04:22), Max: 99.3 (17 Oct 2021 20:52)  HR: 81 (18 Oct 2021 04:22) (77 - 93)  BP: 129/67 (18 Oct 2021 04:22) (122/70 - 132/76)  BP(mean): --  ABP: --  ABP(mean): --  RR: 20 (18 Oct 2021 04:22) (18 - 20)  SpO2: 93% (18 Oct 2021 04:22) (90% - 93%)           Input and Output:  I&O's Detail    16 Oct 2021 07:01  -  17 Oct 2021 07:00  --------------------------------------------------------  IN:    Oral Fluid: 360 mL  Total IN: 360 mL    OUT:  Total OUT: 0 mL    Total NET: 360 mL      17 Oct 2021 07:01  -  18 Oct 2021 05:33  --------------------------------------------------------  IN:    Oral Fluid: 260 mL  Total IN: 260 mL    OUT:  Total OUT: 0 mL    Total NET: 260 mL          Lab Data                        14.3   10.64 )-----------( 231      ( 17 Oct 2021 08:17 )             42.6     10-17    140  |  106  |  22  ----------------------------<  112<H>  3.8   |  28  |  0.79    Ca    8.7      17 Oct 2021 08:17    TPro  6.9  /  Alb  2.5<L>  /  TBili  0.5  /  DBili  x   /  AST  23  /  ALT  70  /  AlkPhos  149<H>  10-17            Review of Systems	      Objective     Physical Examination  heart s1s2  lung dec BS  abd soft        Pertinent Lab findings & Imaging      Eliu:  NO   Adequate UO     I&O's Detail    16 Oct 2021 07:01  -  17 Oct 2021 07:00  --------------------------------------------------------  IN:    Oral Fluid: 360 mL  Total IN: 360 mL    OUT:  Total OUT: 0 mL    Total NET: 360 mL      17 Oct 2021 07:01  -  18 Oct 2021 05:33  --------------------------------------------------------  IN:    Oral Fluid: 260 mL  Total IN: 260 mL    OUT:  Total OUT: 0 mL    Total NET: 260 mL               Discussed with:     Cultures:	        Radiology

## 2021-10-18 NOTE — PROGRESS NOTE ADULT - PROBLEM SELECTOR PROBLEM 1
Pneumonia due to COVID-19 virus

## 2021-10-18 NOTE — PROGRESS NOTE ADULT - SUBJECTIVE AND OBJECTIVE BOX
INTERVAL HPI/OVERNIGHT EVENTS:  Patient seen and examined at bedside. Patient appears emotional today. States he feels like he yost snot have anything to live for. States his breathing is fine, denies any chest pain. Still with SOB on exertion. Patient still with productive cough.    MEDICATIONS  (STANDING):  ALBUTerol    90 MICROgram(s) HFA Inhaler 2 Puff(s) Inhalation every 6 hours  aspirin  chewable 81 milliGRAM(s) Oral daily  atorvastatin 20 milliGRAM(s) Oral at bedtime  clopidogrel Tablet 75 milliGRAM(s) Oral daily  enoxaparin Injectable 40 milliGRAM(s) SubCutaneous every 12 hours  fluticasone furoate/vilanterol 100-25 MICROgram(s) Inhaler 1 Puff(s) Inhalation daily  fluticasone propionate 50 MICROgram(s)/spray Nasal Spray 1 Spray(s) Both Nostrils two times a day  guaifenesin/dextromethorphan Oral Liquid 5 milliLiter(s) Oral every 6 hours  mirtazapine 7.5 milliGRAM(s) Oral at bedtime  OLANZapine Injectable 5 milliGRAM(s) IntraMuscular once  QUEtiapine 25 milliGRAM(s) Oral at bedtime  sodium chloride 0.65% Nasal 1 Spray(s) Both Nostrils two times a day  venlafaxine 37.5 milliGRAM(s) Oral two times a day with meals    MEDICATIONS  (PRN):  acetaminophen   Tablet .. 650 milliGRAM(s) Oral every 6 hours PRN Temp greater or equal to 38C (100.4F), Mild Pain (1 - 3)  melatonin 5 milliGRAM(s) Oral at bedtime PRN Insomnia      Allergies    No Known Allergies    Intolerances        ROS:  CONSTITUTIONAL: + generalized weakness, no fevers or chills  EYES/ENT: No visual changes;  No vertigo or throat pain   NECK: No pain or stiffness  RESPIRATORY: + cough, no wheezing, hemoptysis; + shortness of breath on exertion  CARDIOVASCULAR: No chest pain or palpitations  GASTROINTESTINAL: No abdominal or epigastric pain. No nausea, vomiting, or hematemesis  GENITOURINARY: No dysuria, frequency or hematuria  NEUROLOGICAL: No numbness or weakness  PSYCH: admits he is sad, upset regarding his current situation  All other review of systems is negative unless indicated above.      Vital Signs Last 24 Hrs  T(C): 36.7 (18 Oct 2021 12:46), Max: 37.4 (17 Oct 2021 20:52)  T(F): 98 (18 Oct 2021 12:46), Max: 99.3 (17 Oct 2021 20:52)  HR: 93 (18 Oct 2021 12:46) (77 - 93)  BP: 108/64 (18 Oct 2021 12:46) (106/59 - 132/76)  BP(mean): --  RR: 18 (18 Oct 2021 12:46) (18 - 20)  SpO2: 91% (18 Oct 2021 12:46) (90% - 93%)    10-17 @ 07:01  -  10-18 @ 07:00  --------------------------------------------------------  IN: 260 mL / OUT: 0 mL / NET: 260 mL      Physical Exam:  General: NAD  Neurology: A&Ox3, nonfocal, NINA x 4  Respiratory: dec breath sounds B/L lung fields, +crackles at bases  CV: RRR, S1S2  Abdominal: Soft, NT, ND +BS  Extremities: No edema, + peripheral pulses  Psych: depressed      LABS:                        13.7   10.42 )-----------( 239      ( 18 Oct 2021 07:32 )             42.4     10-18    138  |  104  |  21  ----------------------------<  138<H>  3.8   |  27  |  0.79    Ca    8.5      18 Oct 2021 07:32    TPro  6.7  /  Alb  2.3<L>  /  TBili  0.5  /  DBili  x   /  AST  16  /  ALT  54  /  AlkPhos  136<H>  10-18          RADIOLOGY & ADDITIONAL TESTS:

## 2021-10-18 NOTE — PROGRESS NOTE ADULT - PROBLEM SELECTOR PLAN 1
-Acute hypoxic respiratory failure 2/2 confirmed COVID-19 infection  - Supp O2 prn to maintain O2 sats >88%. Prone PRN; patient has been weaned down to NC 3L today  - S/p  Remdesvir and steroids  - Agitation/ Mental status change possibly 2/2 steroids and encephalopathy. Mentals status improved and off 1:1 observation and wrist retrains. Monitor closely. Continue Seroquel 25mg at bedtime.   - Encourage oral hydration.  - Avoid nebulizers. continue with metered dose inhalers prn.  Pt's family brought in home breo-ellipta  - VTE ppx: lovenox 40mg BID

## 2021-10-18 NOTE — BH CONSULTATION LIAISON ASSESSMENT NOTE - CURRENT MEDICATION
MEDICATIONS  (STANDING):  ALBUTerol    90 MICROgram(s) HFA Inhaler 2 Puff(s) Inhalation every 6 hours  aspirin  chewable 81 milliGRAM(s) Oral daily  atorvastatin 20 milliGRAM(s) Oral at bedtime  clopidogrel Tablet 75 milliGRAM(s) Oral daily  enoxaparin Injectable 40 milliGRAM(s) SubCutaneous every 12 hours  fluticasone furoate/vilanterol 100-25 MICROgram(s) Inhaler 1 Puff(s) Inhalation daily  fluticasone propionate 50 MICROgram(s)/spray Nasal Spray 1 Spray(s) Both Nostrils two times a day  guaifenesin/dextromethorphan Oral Liquid 5 milliLiter(s) Oral every 6 hours  mirtazapine 7.5 milliGRAM(s) Oral at bedtime  OLANZapine Injectable 5 milliGRAM(s) IntraMuscular once  QUEtiapine 25 milliGRAM(s) Oral at bedtime  sodium chloride 0.65% Nasal 1 Spray(s) Both Nostrils two times a day  venlafaxine 37.5 milliGRAM(s) Oral two times a day with meals    MEDICATIONS  (PRN):  acetaminophen   Tablet .. 650 milliGRAM(s) Oral every 6 hours PRN Temp greater or equal to 38C (100.4F), Mild Pain (1 - 3)  melatonin 5 milliGRAM(s) Oral at bedtime PRN Insomnia
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
## 2021-10-18 NOTE — PROGRESS NOTE ADULT - NUTRITIONAL ASSESSMENT
This patient has been assessed with a concern for Malnutrition and has been determined to have a diagnosis/diagnoses of Severe protein-calorie malnutrition.    This patient is being managed with:   Diet DASH/TLC-  Sodium & Cholesterol Restricted  Supplement Feeding Modality:  Oral  Ensure Enlive Cans or Servings Per Day:  3       Frequency:  Three Times a day  Entered: Oct  9 2021  8:23AM    

## 2021-10-18 NOTE — BH CONSULTATION LIAISON ASSESSMENT NOTE - NSBHCHARTREVIEWVS_PSY_A_CORE FT
Vital Signs Last 24 Hrs  T(C): 36.7 (18 Oct 2021 12:46), Max: 37.4 (17 Oct 2021 20:52)  T(F): 98 (18 Oct 2021 12:46), Max: 99.3 (17 Oct 2021 20:52)  HR: 93 (18 Oct 2021 12:46) (77 - 93)  BP: 108/64 (18 Oct 2021 12:46) (106/59 - 132/76)  BP(mean): --  RR: 18 (18 Oct 2021 12:46) (18 - 20)  SpO2: 91% (18 Oct 2021 12:46) (90% - 93%)

## 2021-10-18 NOTE — PROGRESS NOTE ADULT - PROBLEM SELECTOR PLAN 6
-Patient expressing thoughts of dying, states "I have nothing to live for, I should just die". Emotional support provided  -Will consult psych  -Patient denies any intention to harm himself or others, states he is frustrated by his current situation

## 2021-10-19 ENCOUNTER — TRANSCRIPTION ENCOUNTER (OUTPATIENT)
Age: 86
End: 2021-10-19

## 2021-10-19 VITALS
SYSTOLIC BLOOD PRESSURE: 144 MMHG | HEART RATE: 95 BPM | RESPIRATION RATE: 17 BRPM | DIASTOLIC BLOOD PRESSURE: 75 MMHG | TEMPERATURE: 99 F | OXYGEN SATURATION: 95 %

## 2021-10-19 LAB
ALBUMIN SERPL ELPH-MCNC: 2.3 G/DL — LOW (ref 3.3–5)
ALP SERPL-CCNC: 135 U/L — HIGH (ref 40–120)
ALT FLD-CCNC: 55 U/L — SIGNIFICANT CHANGE UP (ref 12–78)
ANION GAP SERPL CALC-SCNC: 4 MMOL/L — LOW (ref 5–17)
AST SERPL-CCNC: 23 U/L — SIGNIFICANT CHANGE UP (ref 15–37)
BASOPHILS # BLD AUTO: 0.02 K/UL — SIGNIFICANT CHANGE UP (ref 0–0.2)
BASOPHILS NFR BLD AUTO: 0.2 % — SIGNIFICANT CHANGE UP (ref 0–2)
BILIRUB SERPL-MCNC: 0.5 MG/DL — SIGNIFICANT CHANGE UP (ref 0.2–1.2)
BUN SERPL-MCNC: 21 MG/DL — SIGNIFICANT CHANGE UP (ref 7–23)
CALCIUM SERPL-MCNC: 8.9 MG/DL — SIGNIFICANT CHANGE UP (ref 8.5–10.1)
CHLORIDE SERPL-SCNC: 102 MMOL/L — SIGNIFICANT CHANGE UP (ref 96–108)
CO2 SERPL-SCNC: 32 MMOL/L — HIGH (ref 22–31)
CREAT SERPL-MCNC: 0.91 MG/DL — SIGNIFICANT CHANGE UP (ref 0.5–1.3)
EOSINOPHIL # BLD AUTO: 0.11 K/UL — SIGNIFICANT CHANGE UP (ref 0–0.5)
EOSINOPHIL NFR BLD AUTO: 1.1 % — SIGNIFICANT CHANGE UP (ref 0–6)
GLUCOSE SERPL-MCNC: 149 MG/DL — HIGH (ref 70–99)
HCT VFR BLD CALC: 43 % — SIGNIFICANT CHANGE UP (ref 39–50)
HGB BLD-MCNC: 14 G/DL — SIGNIFICANT CHANGE UP (ref 13–17)
IMM GRANULOCYTES NFR BLD AUTO: 0.5 % — SIGNIFICANT CHANGE UP (ref 0–1.5)
LYMPHOCYTES # BLD AUTO: 1.3 K/UL — SIGNIFICANT CHANGE UP (ref 1–3.3)
LYMPHOCYTES # BLD AUTO: 12.9 % — LOW (ref 13–44)
MCHC RBC-ENTMCNC: 29.9 PG — SIGNIFICANT CHANGE UP (ref 27–34)
MCHC RBC-ENTMCNC: 32.6 GM/DL — SIGNIFICANT CHANGE UP (ref 32–36)
MCV RBC AUTO: 91.7 FL — SIGNIFICANT CHANGE UP (ref 80–100)
MONOCYTES # BLD AUTO: 0.84 K/UL — SIGNIFICANT CHANGE UP (ref 0–0.9)
MONOCYTES NFR BLD AUTO: 8.4 % — SIGNIFICANT CHANGE UP (ref 2–14)
NEUTROPHILS # BLD AUTO: 7.73 K/UL — HIGH (ref 1.8–7.4)
NEUTROPHILS NFR BLD AUTO: 76.9 % — SIGNIFICANT CHANGE UP (ref 43–77)
NRBC # BLD: 0 /100 WBCS — SIGNIFICANT CHANGE UP (ref 0–0)
PLATELET # BLD AUTO: 219 K/UL — SIGNIFICANT CHANGE UP (ref 150–400)
POTASSIUM SERPL-MCNC: 4.3 MMOL/L — SIGNIFICANT CHANGE UP (ref 3.5–5.3)
POTASSIUM SERPL-SCNC: 4.3 MMOL/L — SIGNIFICANT CHANGE UP (ref 3.5–5.3)
PROT SERPL-MCNC: 7.2 G/DL — SIGNIFICANT CHANGE UP (ref 6–8.3)
RBC # BLD: 4.69 M/UL — SIGNIFICANT CHANGE UP (ref 4.2–5.8)
RBC # FLD: 13.5 % — SIGNIFICANT CHANGE UP (ref 10.3–14.5)
SODIUM SERPL-SCNC: 138 MMOL/L — SIGNIFICANT CHANGE UP (ref 135–145)
WBC # BLD: 10.05 K/UL — SIGNIFICANT CHANGE UP (ref 3.8–10.5)
WBC # FLD AUTO: 10.05 K/UL — SIGNIFICANT CHANGE UP (ref 3.8–10.5)

## 2021-10-19 PROCEDURE — 99231 SBSQ HOSP IP/OBS SF/LOW 25: CPT

## 2021-10-19 PROCEDURE — 96374 THER/PROPH/DIAG INJ IV PUSH: CPT

## 2021-10-19 PROCEDURE — 85730 THROMBOPLASTIN TIME PARTIAL: CPT

## 2021-10-19 PROCEDURE — 84145 PROCALCITONIN (PCT): CPT

## 2021-10-19 PROCEDURE — 36415 COLL VENOUS BLD VENIPUNCTURE: CPT

## 2021-10-19 PROCEDURE — 99284 EMERGENCY DEPT VISIT MOD MDM: CPT | Mod: 25

## 2021-10-19 PROCEDURE — 80048 BASIC METABOLIC PNL TOTAL CA: CPT

## 2021-10-19 PROCEDURE — 87086 URINE CULTURE/COLONY COUNT: CPT

## 2021-10-19 PROCEDURE — U0003: CPT

## 2021-10-19 PROCEDURE — 80076 HEPATIC FUNCTION PANEL: CPT

## 2021-10-19 PROCEDURE — 85379 FIBRIN DEGRADATION QUANT: CPT

## 2021-10-19 PROCEDURE — 84484 ASSAY OF TROPONIN QUANT: CPT

## 2021-10-19 PROCEDURE — 99285 EMERGENCY DEPT VISIT HI MDM: CPT

## 2021-10-19 PROCEDURE — 97162 PT EVAL MOD COMPLEX 30 MIN: CPT

## 2021-10-19 PROCEDURE — 83605 ASSAY OF LACTIC ACID: CPT

## 2021-10-19 PROCEDURE — 84100 ASSAY OF PHOSPHORUS: CPT

## 2021-10-19 PROCEDURE — 80053 COMPREHEN METABOLIC PANEL: CPT

## 2021-10-19 PROCEDURE — 93005 ELECTROCARDIOGRAM TRACING: CPT

## 2021-10-19 PROCEDURE — 71045 X-RAY EXAM CHEST 1 VIEW: CPT

## 2021-10-19 PROCEDURE — U0005: CPT

## 2021-10-19 PROCEDURE — 97530 THERAPEUTIC ACTIVITIES: CPT

## 2021-10-19 PROCEDURE — 82728 ASSAY OF FERRITIN: CPT

## 2021-10-19 PROCEDURE — 96361 HYDRATE IV INFUSION ADD-ON: CPT

## 2021-10-19 PROCEDURE — 96360 HYDRATION IV INFUSION INIT: CPT

## 2021-10-19 PROCEDURE — 82565 ASSAY OF CREATININE: CPT

## 2021-10-19 PROCEDURE — 85025 COMPLETE CBC W/AUTO DIFF WBC: CPT

## 2021-10-19 PROCEDURE — 85610 PROTHROMBIN TIME: CPT

## 2021-10-19 PROCEDURE — 94640 AIRWAY INHALATION TREATMENT: CPT

## 2021-10-19 PROCEDURE — 83735 ASSAY OF MAGNESIUM: CPT

## 2021-10-19 PROCEDURE — G1004: CPT

## 2021-10-19 PROCEDURE — 85027 COMPLETE CBC AUTOMATED: CPT

## 2021-10-19 PROCEDURE — 97110 THERAPEUTIC EXERCISES: CPT

## 2021-10-19 PROCEDURE — 86769 SARS-COV-2 COVID-19 ANTIBODY: CPT

## 2021-10-19 PROCEDURE — 97116 GAIT TRAINING THERAPY: CPT

## 2021-10-19 PROCEDURE — 83036 HEMOGLOBIN GLYCOSYLATED A1C: CPT

## 2021-10-19 PROCEDURE — 81001 URINALYSIS AUTO W/SCOPE: CPT

## 2021-10-19 PROCEDURE — 87040 BLOOD CULTURE FOR BACTERIA: CPT

## 2021-10-19 PROCEDURE — 70450 CT HEAD/BRAIN W/O DYE: CPT | Mod: MG

## 2021-10-19 PROCEDURE — 86140 C-REACTIVE PROTEIN: CPT

## 2021-10-19 PROCEDURE — 99239 HOSP IP/OBS DSCHRG MGMT >30: CPT

## 2021-10-19 PROCEDURE — 83880 ASSAY OF NATRIURETIC PEPTIDE: CPT

## 2021-10-19 RX ORDER — ATORVASTATIN CALCIUM 80 MG/1
1 TABLET, FILM COATED ORAL
Qty: 0 | Refills: 0 | DISCHARGE
Start: 2021-10-19

## 2021-10-19 RX ORDER — GUAIFENESIN/DEXTROMETHORPHAN 600MG-30MG
5 TABLET, EXTENDED RELEASE 12 HR ORAL
Qty: 0 | Refills: 0 | DISCHARGE
Start: 2021-10-19

## 2021-10-19 RX ORDER — ALBUTEROL 90 UG/1
2 AEROSOL, METERED ORAL
Qty: 0 | Refills: 0 | DISCHARGE
Start: 2021-10-19

## 2021-10-19 RX ORDER — FLUTICASONE FUROATE AND VILANTEROL TRIFENATATE 100; 25 UG/1; UG/1
2 POWDER RESPIRATORY (INHALATION)
Qty: 0 | Refills: 0 | DISCHARGE
Start: 2021-10-19

## 2021-10-19 RX ORDER — ACETAMINOPHEN 500 MG
2 TABLET ORAL
Qty: 0 | Refills: 0 | DISCHARGE
Start: 2021-10-19

## 2021-10-19 RX ORDER — VENLAFAXINE HCL 75 MG
1 CAPSULE, EXT RELEASE 24 HR ORAL
Qty: 0 | Refills: 0 | DISCHARGE
Start: 2021-10-19

## 2021-10-19 RX ORDER — ASPIRIN/CALCIUM CARB/MAGNESIUM 324 MG
1 TABLET ORAL
Qty: 0 | Refills: 0 | DISCHARGE
Start: 2021-10-19

## 2021-10-19 RX ORDER — LANOLIN ALCOHOL/MO/W.PET/CERES
1 CREAM (GRAM) TOPICAL
Qty: 0 | Refills: 0 | DISCHARGE
Start: 2021-10-19

## 2021-10-19 RX ORDER — FLUTICASONE PROPIONATE 50 MCG
1 SPRAY, SUSPENSION NASAL
Qty: 0 | Refills: 0 | DISCHARGE
Start: 2021-10-19

## 2021-10-19 RX ORDER — QUETIAPINE FUMARATE 200 MG/1
1 TABLET, FILM COATED ORAL
Qty: 0 | Refills: 0 | DISCHARGE
Start: 2021-10-19

## 2021-10-19 RX ORDER — MIRTAZAPINE 45 MG/1
1 TABLET, ORALLY DISINTEGRATING ORAL
Qty: 0 | Refills: 0 | DISCHARGE
Start: 2021-10-19

## 2021-10-19 RX ADMIN — Medication 5 MILLILITER(S): at 00:28

## 2021-10-19 RX ADMIN — ALBUTEROL 2 PUFF(S): 90 AEROSOL, METERED ORAL at 00:30

## 2021-10-19 RX ADMIN — CLOPIDOGREL BISULFATE 75 MILLIGRAM(S): 75 TABLET, FILM COATED ORAL at 11:39

## 2021-10-19 RX ADMIN — Medication 5 MILLILITER(S): at 11:40

## 2021-10-19 RX ADMIN — FLUTICASONE FUROATE AND VILANTEROL TRIFENATATE 1 PUFF(S): 100; 25 POWDER RESPIRATORY (INHALATION) at 08:12

## 2021-10-19 RX ADMIN — Medication 1 SPRAY(S): at 05:42

## 2021-10-19 RX ADMIN — Medication 81 MILLIGRAM(S): at 11:40

## 2021-10-19 RX ADMIN — ENOXAPARIN SODIUM 40 MILLIGRAM(S): 100 INJECTION SUBCUTANEOUS at 05:42

## 2021-10-19 RX ADMIN — ALBUTEROL 2 PUFF(S): 90 AEROSOL, METERED ORAL at 08:12

## 2021-10-19 RX ADMIN — Medication 5 MILLILITER(S): at 05:42

## 2021-10-19 RX ADMIN — Medication 37.5 MILLIGRAM(S): at 10:53

## 2021-10-19 NOTE — PROGRESS NOTE ADULT - SUBJECTIVE AND OBJECTIVE BOX
Date/Time Patient Seen:  		  Referring MD:   Data Reviewed	       Patient is a 89y old  Male who presents with a chief complaint of COVID-19 (18 Oct 2021 17:49)      Subjective/HPI     PAST MEDICAL & SURGICAL HISTORY:  HLD (hyperlipidemia)    Osteoarthritis    CAD (coronary artery disease)    H/O rotator cuff surgery  R shoulder          Medication list         MEDICATIONS  (STANDING):  ALBUTerol    90 MICROgram(s) HFA Inhaler 2 Puff(s) Inhalation every 6 hours  aspirin  chewable 81 milliGRAM(s) Oral daily  atorvastatin 20 milliGRAM(s) Oral at bedtime  clopidogrel Tablet 75 milliGRAM(s) Oral daily  enoxaparin Injectable 40 milliGRAM(s) SubCutaneous every 12 hours  fluticasone furoate/vilanterol 100-25 MICROgram(s) Inhaler 1 Puff(s) Inhalation daily  fluticasone propionate 50 MICROgram(s)/spray Nasal Spray 1 Spray(s) Both Nostrils two times a day  guaifenesin/dextromethorphan Oral Liquid 5 milliLiter(s) Oral every 6 hours  mirtazapine 7.5 milliGRAM(s) Oral at bedtime  OLANZapine Injectable 5 milliGRAM(s) IntraMuscular once  QUEtiapine 25 milliGRAM(s) Oral at bedtime  sodium chloride 0.65% Nasal 1 Spray(s) Both Nostrils two times a day  venlafaxine 37.5 milliGRAM(s) Oral two times a day with meals    MEDICATIONS  (PRN):  acetaminophen   Tablet .. 650 milliGRAM(s) Oral every 6 hours PRN Temp greater or equal to 38C (100.4F), Mild Pain (1 - 3)  melatonin 5 milliGRAM(s) Oral at bedtime PRN Insomnia         Vitals log        ICU Vital Signs Last 24 Hrs  T(C): 36.8 (19 Oct 2021 04:14), Max: 36.9 (18 Oct 2021 21:25)  T(F): 98.2 (19 Oct 2021 04:14), Max: 98.4 (18 Oct 2021 21:25)  HR: 83 (19 Oct 2021 04:14) (77 - 93)  BP: 131/77 (19 Oct 2021 04:14) (106/59 - 131/77)  BP(mean): --  ABP: --  ABP(mean): --  RR: 18 (19 Oct 2021 04:14) (18 - 20)  SpO2: 94% (19 Oct 2021 04:14) (88% - 94%)           Input and Output:  I&O's Detail    17 Oct 2021 07:01  -  18 Oct 2021 07:00  --------------------------------------------------------  IN:    Oral Fluid: 260 mL  Total IN: 260 mL    OUT:  Total OUT: 0 mL    Total NET: 260 mL      18 Oct 2021 07:01  -  19 Oct 2021 05:15  --------------------------------------------------------  IN:    Oral Fluid: 400 mL  Total IN: 400 mL    OUT:  Total OUT: 0 mL    Total NET: 400 mL          Lab Data                        13.7   10.42 )-----------( 239      ( 18 Oct 2021 07:32 )             42.4     10-18    138  |  104  |  21  ----------------------------<  138<H>  3.8   |  27  |  0.79    Ca    8.5      18 Oct 2021 07:32    TPro  6.7  /  Alb  2.3<L>  /  TBili  0.5  /  DBili  x   /  AST  16  /  ALT  54  /  AlkPhos  136<H>  10-18            Review of Systems	      Objective     Physical Examination    heart s1s2  lung dec BS  abd soft      Pertinent Lab findings & Imaging      Eliu:  NO   Adequate UO     I&O's Detail    17 Oct 2021 07:01  -  18 Oct 2021 07:00  --------------------------------------------------------  IN:    Oral Fluid: 260 mL  Total IN: 260 mL    OUT:  Total OUT: 0 mL    Total NET: 260 mL      18 Oct 2021 07:01  -  19 Oct 2021 05:15  --------------------------------------------------------  IN:    Oral Fluid: 400 mL  Total IN: 400 mL    OUT:  Total OUT: 0 mL    Total NET: 400 mL               Discussed with:     Cultures:	        Radiology

## 2021-10-19 NOTE — PROGRESS NOTE ADULT - ASSESSMENT
89 y/o M with PMH of TIA s/p R endarterectomy (2011), hyperlipidemia, hiatal hernia, GERD admitted for COVID-19, hospital course complicated by agitation and mental status change likely due to encephalopathy     o2 titration  on Inhaler rx regimen  [facility] rx regimen  s/p decadron - remdesivir  on seroquel  monitor Mental Status  assist with needs - ADL  GERD - PPI  nutrition  isolation - covid -   GOC discussion
89 y/o M with PMH of TIA s/p R endarterectomy (2011), hyperlipidemia, hiatal hernia, GERD admitted for COVID-19.    d dimer noted  proBNP noted    [facility]vs rx regimen  decadron - hypoxemia and covid  repeat d dimer  tylenol - cough rx regimen PRN  GERD - PPI - hiatal hernia - PPI -   isolation precs  dvt p  assist with needs - ADL  delirium - fall prec - monitor for needs  supportive medical regimen in progress  prognosis guarded - advanced age - delirium - covid 19 with hypoxemia  completed Remdesivir  
Patient is a 89 y/o M with PMH of TIA s/p R endarterectomy (2011), hyperlipidemia, hiatal hernia, GERD admitted for COVID-19, hospital course complicated by agitation and mental status change likely due to encephalopathy 
Patient is a 89 y/o M with PMH of TIA s/p R endarterectomy (2011), hyperlipidemia, hiatal hernia, GERD admitted for COVID-19.
89 y/o M with PMH of TIA s/p R endarterectomy (2011), hyperlipidemia, hiatal hernia, GERD admitted for COVID-19, hospital course complicated by agitation and mental status change likely due to encephalopathy     psych note reviewed - covid pos on Oct 18 - CM notes reviewed -     o2 titration  on Inhaler rx regimen  [facility]vs rx regimen  s/p decadron - remdesivir  on seroquel  monitor Mental Status  assist with needs - ADL  GERD - PPI  nutrition  isolation - covid -   GOC discussion
Patient is a 89 y/o M with PMH of TIA s/p R endarterectomy (2011), hyperlipidemia, hiatal hernia, GERD admitted for COVID-19, hospital course complicated by agitation and mental status change likely due to encephalopathy 
Patient is a 89 y/o M with PMH of TIA s/p R endarterectomy (2011), hyperlipidemia, hiatal hernia, GERD admitted for COVID-19.
Patient is a 89 y/o M with PMH of TIA s/p R endarterectomy (2011), hyperlipidemia, hiatal hernia, GERD admitted for COVID-19, hospital course complicated by agitation and mental status change likely due to encephalopathy 
Patient is a 89 y/o M with PMH of TIA s/p R endarterectomy (2011), hyperlipidemia, hiatal hernia, GERD admitted for COVID-19, hospital course complicated by agitation and mental status change likely due to encephalopathy 
Patient is a 89 y/o M with PMH of TIA s/p R endarterectomy (2011), hyperlipidemia, hiatal hernia, GERD admitted for COVID-19, hospital course complicated by agitation and mental status change likely due to encephalopathy 
Patient is a 89 y/o M with PMH of TIA s/p R endarterectomy (2011), hyperlipidemia, hiatal hernia, GERD admitted for COVID-19.
Patient is a 89 y/o M with PMH of TIA s/p R endarterectomy (2011), hyperlipidemia, hiatal hernia, GERD admitted for COVID-19.
Patient is a 89 y/o M with PMH of TIA s/p R endarterectomy (2011), hyperlipidemia, hiatal hernia, GERD admitted for COVID-19.
Patient is a 89 y/o M with PMH of TIA s/p R endarterectomy (2011), hyperlipidemia, hiatal hernia, GERD admitted for COVID-19, hospital course complicated by agitation and mental status change likely due to encephalopathy 

## 2021-10-19 NOTE — DISCHARGE NOTE NURSING/CASE MANAGEMENT/SOCIAL WORK - PATIENT PORTAL LINK FT
You can access the FollowMyHealth Patient Portal offered by [facility] by registering at the following website: [email or web address]. By joining [facility]’s FollowMyHealth portal, you will also be able to view your health information using other applications (apps) compatible with our system.

## 2021-10-19 NOTE — DISCHARGE NOTE PROVIDER - CARE PROVIDER_API CALL
Joy Collier)  Psychiatry  [address] [place] [address]  Phone: [phone]  Fax: [phone]  Follow Up Time: Routine
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
## 2021-10-19 NOTE — BH CONSULTATION LIAISON PROGRESS NOTE - NSBHFUPINTERVALHXFT_PSY_A_CORE
Patient seen, evaluated and chart reviewed. Patient reports no change in his mood. Patient is compliant with treatment, no side-effects are reported.

## 2021-10-19 NOTE — CHART NOTE - NSCHARTNOTEFT_GEN_A_CORE
Nutrition Follow Up Note    Seen for malnutrition follow up    Chart reviewed, events noted.     Per chart, 89 y/o M with PMH of TIA s/p R endarterectomy (2011), hyperlipidemia, hiatal hernia, GERD admitted for COVID-19, hospital course complicated by agitation and mental status change likely due to encephalopathy. Medically cleared for d/c to [facility]; planning in progress.     Diet :  Diet, DASH/TLC:   Sodium & Cholesterol Restricted  Supplement Feeding Modality:  Oral  Ensure Enlive Cans or Servings Per Day:  3       Frequency:  Three Times a day (10-09-21 @ 08:24)      Nutrition Events:   -Intake: per flowsheet, % intake; appetite improving as LOS progresses   -GI: last BM noted 10/17   -Resp: weaned to NC 3L; acceptable for [facility]       Pertinent Medications:  MEDICATIONS  (STANDING):  ALBUTerol    90 MICROgram(s) HFA Inhaler 2 Puff(s) Inhalation every 6 hours  aspirin  chewable 81 milliGRAM(s) Oral daily  atorvastatin 20 milliGRAM(s) Oral at bedtime  clopidogrel Tablet 75 milliGRAM(s) Oral daily  enoxaparin Injectable 40 milliGRAM(s) SubCutaneous every 12 hours  fluticasone furoate/vilanterol 100-25 MICROgram(s) Inhaler 1 Puff(s) Inhalation daily  fluticasone propionate 50 MICROgram(s)/spray Nasal Spray 1 Spray(s) Both Nostrils two times a day  guaifenesin/dextromethorphan Oral Liquid 5 milliLiter(s) Oral every 6 hours  mirtazapine 7.5 milliGRAM(s) Oral at bedtime  OLANZapine Injectable 5 milliGRAM(s) IntraMuscular once  QUEtiapine 25 milliGRAM(s) Oral at bedtime  sodium chloride 0.65% Nasal 1 Spray(s) Both Nostrils two times a day  venlafaxine 37.5 milliGRAM(s) Oral two times a day with meals    MEDICATIONS  (PRN):  acetaminophen   Tablet .. 650 milliGRAM(s) Oral every 6 hours PRN Temp greater or equal to 38C (100.4F), Mild Pain (1 - 3)  melatonin 5 milliGRAM(s) Oral at bedtime PRN Insomnia        Pertinent Labs:  10-19 Na138 mmol/L Glu 149 mg/dL<H> K+ 4.3 mmol/L Cr  0.91 mg/dL BUN 21 mg/dL 10-13 Phos 3.2 mg/dL 10-19 Alb 2.3 g/dL<L>     CAPILLARY BLOOD GLUCOSE           Pressure Injury per chart: none noted    Edema per chart:   -1+ dependent     Estimated Needs: based on   [x] no change since previous assessment      Previous Nutrition Diagnosis: malnutrition    Nutrition Diagnosis is: ongoing      New Nutrition Diagnosis: N/A    Interventions and Recommendations  1) Continue current diet and supplements     Monitoring and Evaluation:     [X] PO intake [X] Tolerance to diet prescription [X] weight trends [x] skin integrity     RD remains available and will follow up per protocol.

## 2021-10-19 NOTE — BH CONSULTATION LIAISON PROGRESS NOTE - NSBHCHARTREVIEWLAB_PSY_A_CORE FT
14.0   10.05 )-----------( 219      ( 19 Oct 2021 07:50 )             43.0   10-19    138  |  102  |  21  ----------------------------<  149<H>  4.3   |  32<H>  |  0.91    Ca    8.9      19 Oct 2021 07:50    TPro  7.2  /  Alb  2.3<L>  /  TBili  0.5  /  DBili  x   /  AST  23  /  ALT  55  /  AlkPhos  135<H>  10-19

## 2021-10-19 NOTE — PROGRESS NOTE ADULT - PROVIDER SPECIALTY LIST ADULT
Infectious Disease
Pulmonology
Pulmonology
Hospitalist
Pulmonology
Hospitalist

## 2021-10-19 NOTE — DISCHARGE NOTE PROVIDER - NSDCMRMEDTOKEN_GEN_ALL_CORE_FT
Lipitor 20 mg oral tablet: 1 tab(s) orally once a day  Plavix 75 mg oral tablet: 1 tab(s) orally once a day  Vitamin D2 1.25 mg (50,000 intl units) oral capsule: 1 cap(s) orally once a week   acetaminophen 325 mg oral tablet: 2 tab(s) orally every 6 hours, As needed, Temp greater or equal to 38C (100.4F), Mild Pain (1 - 3)  albuterol 90 mcg/inh inhalation aerosol: 2 puff(s) inhaled every 6 hours  aspirin 81 mg oral tablet, chewable: 1 tab(s) orally once a day  atorvastatin 20 mg oral tablet: 1 tab(s) orally once a day (at bedtime)  fluticasone 50 mcg/inh nasal spray: 1 spray(s) nasal 2 times a day  fluticasone-vilanterol 100 mcg-25 mcg/inh inhalation powder: 2 puff(s) inhaled 2 times a day  guaifenesin-dextromethorphan 100 mg-10 mg/5 mL oral liquid: 5 milliliter(s) orally every 6 hours  melatonin 5 mg oral tablet: 1 tab(s) orally once a day (at bedtime), As needed, Insomnia  mirtazapine 7.5 mg oral tablet: 1 tab(s) orally once a day (at bedtime)  Plavix 75 mg oral tablet: 1 tab(s) orally once a day  QUEtiapine 25 mg oral tablet: 1 tab(s) orally once a day (at bedtime)  venlafaxine 37.5 mg oral tablet: 1 tab(s) orally 2 times a day (with meals)  Vitamin D2 1.25 mg (50,000 intl units) oral capsule: 1 cap(s) orally once a week  Xarelto 10 mg oral tablet: 1 tab(s) orally once a day   acetaminophen 325 mg oral tablet: 2 tab(s) orally every 6 hours, As needed, Temp greater or equal to 38C (100.4F), Mild Pain (1 - 3)  albuterol 90 mcg/inh inhalation aerosol: 2 puff(s) inhaled every 6 hours  aspirin 81 mg oral tablet, chewable: 1 tab(s) orally once a day  atorvastatin 20 mg oral tablet: 1 tab(s) orally once a day (at bedtime)  fluticasone 50 mcg/inh nasal spray: 1 spray(s) nasal 2 times a day  fluticasone-vilanterol 100 mcg-25 mcg/inh inhalation powder: 2 puff(s) inhaled 2 times a day  guaifenesin-dextromethorphan 100 mg-10 mg/5 mL oral liquid: 5 milliliter(s) orally every 6 hours  melatonin 5 mg oral tablet: 1 tab(s) orally once a day (at bedtime), As needed, Insomnia  mirtazapine 7.5 mg oral tablet: 1 tab(s) orally once a day (at bedtime)  Plavix 75 mg oral tablet: 1 tab(s) orally once a day  QUEtiapine 25 mg oral tablet: 1 tab(s) orally once a day (at bedtime)  venlafaxine 37.5 mg oral tablet: 1 tab(s) orally 2 times a day (with meals)  Vitamin D2 1.25 mg (50,000 intl units) oral capsule: 1 cap(s) orally once a week

## 2021-10-19 NOTE — BH CONSULTATION LIAISON PROGRESS NOTE - NSBHCHARTREVIEWVS_PSY_A_CORE FT
Vital Signs Last 24 Hrs  T(C): 37.3 (19 Oct 2021 13:23), Max: 37.3 (19 Oct 2021 13:23)  T(F): 99.2 (19 Oct 2021 13:23), Max: 99.2 (19 Oct 2021 13:23)  HR: 95 (19 Oct 2021 13:23) (83 - 95)  BP: 144/75 (19 Oct 2021 13:23) (125/70 - 144/75)  BP(mean): --  RR: 17 (19 Oct 2021 13:23) (17 - 20)  SpO2: 95% (19 Oct 2021 13:23) (88% - 95%)

## 2021-10-19 NOTE — PROGRESS NOTE ADULT - REASON FOR ADMISSION
COVID-19

## 2021-10-19 NOTE — DISCHARGE NOTE PROVIDER - NSDCCPCAREPLAN_GEN_ALL_CORE_FT
PRINCIPAL DISCHARGE DIAGNOSIS  Diagnosis: COVID-19  Assessment and Plan of Treatment: You completed course of steroids and remdesivir. COntinue supplemental O2 as needed. Follow up with your PCP within 2 weeks of discharge. If experiencing SOB, chest pain, please return to emergency room.      SECONDARY DISCHARGE DIAGNOSES  Diagnosis: Other depression  Assessment and Plan of Treatment: Continue medications as prescribed  Follow up with Psych as outpatient for further treatment

## 2021-10-26 RX ORDER — ERGOCALCIFEROL 1.25 MG/1
1 CAPSULE ORAL
Qty: 0 | Refills: 0 | DISCHARGE

## 2021-10-26 RX ORDER — CLOPIDOGREL BISULFATE 75 MG/1
1 TABLET, FILM COATED ORAL
Qty: 0 | Refills: 0 | DISCHARGE

## 2021-10-26 RX ORDER — RIVAROXABAN 15 MG-20MG
1 KIT ORAL
Qty: 0 | Refills: 0 | DISCHARGE
End: 2021-11-19

## 2021-10-26 RX ORDER — ATORVASTATIN CALCIUM 80 MG/1
1 TABLET, FILM COATED ORAL
Qty: 0 | Refills: 0 | DISCHARGE

## 2022-04-02 NOTE — BH CONSULTATION LIAISON ASSESSMENT NOTE - NSBHMSEINSIGHT_PSY_A_CORE
Fair brought by ems for s/p multiple seizures . pt is bedbound with history of CVA, HTN, DM, Seizures , dementia .

## 2023-04-03 NOTE — PATIENT PROFILE ADULT - ABILITY TO HEAR (WITH HEARING AID OR HEARING APPLIANCE IF NORMALLY USED):
Patient arrived via wheelchair for paracentesis.  
Patient to IR via cart.  
Patient's dressing to right abdomen C/D/I.  Patient discharged via wheelchair home.  
Mildly to Moderately Impaired: difficulty hearing in some environments or speaker may need to increase volume or speak distinctly

## 2023-04-05 NOTE — ED ADULT NURSE NOTE - BREATH SOUNDS, RIGHT
[FreeTextEntry1] : Follow-up PET/CT April 15, 2021:\par \par IMPRESSION:\par \par 1.  Postsurgical changes of gastrectomy and gastrojejunostomy. Mild homogeneous FDG avidity in the residual stomach is nonspecific, possibly physiologic. No focal FDG avidity at the anastomosis.\par \par 2.  No evidence of FDG avid or enlarged perigastric lymph nodes however evaluation is limited due to lack of intravenous contrast and paucity of intra-abdominal fat which makes evaluation difficult.\par \par 3.  Widespread diffuse FDG avidity of the bone marrow, probably due to administration of colony stimulating factors. Clinical correlation suggested.\par \par 4.  Subcentimeter nodular opacities left upper lobe, too small to characterize.\par 
clear

## 2023-12-26 NOTE — ED PROVIDER NOTE - CROS ED GI ALL NEG
-- DO NOT REPLY / DO NOT REPLY ALL --  -- Message is from Engagement Center Operations ([facility]) --    General Patient Message: Patient's mother called requesting a call back concerning patient. Patient mother states she feels patient is deteriorating and wants to speak with Dr. Nina. Please give patient mother a call back as soon as possible to discuss details and updates regarding patient state of being as well as any other questions patient mother may have.     Caller Information         Type Contact Phone/Fax    12/26/2023 09:56 AM CST Phone (Incoming) Sukhjinder Grahamla (Mother) [phone]          Alternative phone number: n/a     Can a detailed message be left? Yes    Message Turnaround: [place]:    Refer to site's KB page for routing instructions    Please give this turnaround time to the caller:   \"You can expect to receive a response 1-3 business days after your provider's clinical team reviews the message\"               - - -

## 2024-01-17 NOTE — ED PROVIDER NOTE - CARE PLAN
General:     NAD, well-nourished, well-appearing  Head:     NC/AT, EOMI, oral mucosa moist  Neck:     supple  Lungs:     CTA b/l, no w/r/r  CVS:     S1S2, RRR, no m/g/r  Abd:     +BS, s/nt/nd, no organomegaly  Ext:    2+ radial and pedal pulses, no c/c/e  Neuro: grossly intact  back : no midline tenderness Principal Discharge DX:	Generalized weakness   1

## 2024-03-18 NOTE — DISCHARGE NOTE PROVIDER - NSDCCPGOAL_GEN_ALL_CORE_FT
March 18, 2024      [facility]  [address], [address]  [place] [address]  Phone: [phone]       Patient: Octavio Matos   YOB: 2010  Date of Visit: 03/18/2024    To Whom It May Concern:    Shameka Matos  was at [facility] on 03/18/2024. The patient may return to work/school on 3/19/24 with no restrictions. If you have any questions or concerns, or if I can be of further assistance, please do not hesitate to contact me.    Sincerely,      Bette Cardoza PA-C     
To get better and follow your care plan as instructed.

## 2024-07-01 NOTE — ED PROVIDER NOTE - CPE EDP SKIN NORM
Continued Stay SW/CM Assessment/Plan of Care Note       Active Substitute Decision Maker (SDM)    There are no active Substitute Decision Maker (SDM) on file.           Progress note:   [facility] has RN/PT/OT orders.     See SW/CM flowsheets for other objective data.    Disposition Recommendations:  Preliminary discharge destination: Planned Discharge Destination: Home with services/support  SW/CM recommendation for discharge: Home care    Destination Pharmacy: [id number] [address] Preferred pharmacy updated    Discharge Plan/Needs:     Continued Care and Services - Admitted Since 6/14/2024       Home Medical Care Coordination complete.      Service Provider Request Status Selected Services Address Phone Fax    [facility]  Selected Home Health Services [address] [facility] [id number] [phone]                  Continued Care and Services - Linked Episodes Includes continued care and service providers from the active episodes linked to this encounter, which are listed below      Care Transitions Episode start date: 6/14/2024   There are no active outsourced providers for this episode.                     Devices/ Equipment that need to be arranged for discharge: None at this time       Prior To Hospitalization:    Living Situation: Spouse and residing at House    .  Support Systems: Other (Comment) (Ex-spouse)   Home Devices/Equipment: Home Oxygen (T), Pulse Oximeter (T), Shower chair, Blood pressure monitor, Nebulizer (T) [employer]ogen for Home O2 concentrator and portable tank      ([phone])   Mobility Assist Devices: Front-wheeled walker, Wheelchair   Type of Service Prior to Hospitalization: None               Patient/Family discharge goal (s):  Home Care         Current Function  Last Filed Values         Value Time User    Current Function  significantly below baseline level of function 6/28/2024  4:25  PM Enedina Mayberry, PT    Therapy Impairments  activity tolerance; strength 6/28/2024  4:25 PM Enedina Mayberry PT    ADLs Requiring Support  bed mobility; transfers; ambulation; stairs 6/28/2024  4:25 PM Enedina Mayberry, INNA            Therapy Recommendations for Discharge:   PT:      Last Filed Values         Value Time User    PT Discharge Needs  therapy 5 or more times per week 6/28/2024  4:25 PM Enedina Mayberry, PT          OT:       Last Filed Values         Value Time User    OT Discharge Needs  therapy 5 or more times per week 6/24/2024  3:35 PM Nesha Gill, OTR/L          SLP:    Last Filed Values       None                Barriers to Discharge  Identified Barriers to Discharge/Transition Planning: Consult Pending/Clearance                   normal...